# Patient Record
Sex: FEMALE | Race: WHITE | Employment: OTHER | URBAN - NONMETROPOLITAN AREA
[De-identification: names, ages, dates, MRNs, and addresses within clinical notes are randomized per-mention and may not be internally consistent; named-entity substitution may affect disease eponyms.]

---

## 2017-01-30 ENCOUNTER — HOSPITAL ENCOUNTER (OUTPATIENT)
Dept: GENERAL RADIOLOGY | Age: 77
Discharge: HOME | End: 2017-01-30
Payer: MEDICARE

## 2017-01-30 DIAGNOSIS — M25.561 PAIN IN BOTH KNEES, UNSPECIFIED CHRONICITY: ICD-10-CM

## 2017-01-30 DIAGNOSIS — M25.562 PAIN IN BOTH KNEES, UNSPECIFIED CHRONICITY: ICD-10-CM

## 2017-01-30 PROCEDURE — 73565 X-RAY EXAM OF KNEES: CPT

## 2017-01-30 PROCEDURE — 73562 X-RAY EXAM OF KNEE 3: CPT

## 2021-03-10 ENCOUNTER — IMMUNIZATION (OUTPATIENT)
Age: 81
End: 2021-03-10
Payer: MEDICARE

## 2021-03-10 PROCEDURE — 0001A COVID-19, PFIZER VACCINE 30MCG/0.3ML DOSE: CPT | Performed by: FAMILY MEDICINE

## 2021-03-10 PROCEDURE — 91300 COVID-19, PFIZER VACCINE 30MCG/0.3ML DOSE: CPT | Performed by: FAMILY MEDICINE

## 2021-03-18 LAB
ALBUMIN SERPL-MCNC: 3.9 G/DL (ref 3.5–5.2)
ALP BLD-CCNC: 96 U/L (ref 35–104)
ALT SERPL-CCNC: 13 U/L (ref 5–33)
ANION GAP SERPL CALCULATED.3IONS-SCNC: 8 MMOL/L (ref 7–19)
AST SERPL-CCNC: 22 U/L (ref 5–32)
BILIRUB SERPL-MCNC: 0.6 MG/DL (ref 0.2–1.2)
BUN BLDV-MCNC: 10 MG/DL (ref 8–23)
CALCIUM SERPL-MCNC: 9.2 MG/DL (ref 8.8–10.2)
CHLORIDE BLD-SCNC: 95 MMOL/L (ref 98–111)
CHOLESTEROL, TOTAL: 180 MG/DL (ref 160–199)
CO2: 32 MMOL/L (ref 22–29)
CREAT SERPL-MCNC: 0.6 MG/DL (ref 0.5–0.9)
GFR AFRICAN AMERICAN: >59
GFR NON-AFRICAN AMERICAN: >60
GLUCOSE BLD-MCNC: 103 MG/DL (ref 74–109)
HBA1C MFR BLD: 6 % (ref 4–6)
HDLC SERPL-MCNC: 61 MG/DL (ref 65–121)
LDL CHOLESTEROL CALCULATED: 87 MG/DL
POTASSIUM SERPL-SCNC: 5.1 MMOL/L (ref 3.5–5)
SODIUM BLD-SCNC: 135 MMOL/L (ref 136–145)
TOTAL PROTEIN: 6.9 G/DL (ref 6.6–8.7)
TRIGL SERPL-MCNC: 158 MG/DL (ref 0–149)

## 2021-03-31 ENCOUNTER — IMMUNIZATION (OUTPATIENT)
Age: 81
End: 2021-03-31
Payer: MEDICARE

## 2021-03-31 PROCEDURE — 0002A COVID-19, PFIZER VACCINE 30MCG/0.3ML DOSE: CPT | Performed by: FAMILY MEDICINE

## 2021-03-31 PROCEDURE — 91300 COVID-19, PFIZER VACCINE 30MCG/0.3ML DOSE: CPT | Performed by: FAMILY MEDICINE

## 2021-06-24 LAB
ALBUMIN SERPL-MCNC: 3.7 G/DL (ref 3.5–5.2)
ALP BLD-CCNC: 89 U/L (ref 35–104)
ALT SERPL-CCNC: 9 U/L (ref 5–33)
ANION GAP SERPL CALCULATED.3IONS-SCNC: 10 MMOL/L (ref 7–19)
AST SERPL-CCNC: 16 U/L (ref 5–32)
BASOPHILS ABSOLUTE: 0 K/UL (ref 0–0.2)
BASOPHILS RELATIVE PERCENT: 0.5 % (ref 0–1)
BILIRUB SERPL-MCNC: 0.5 MG/DL (ref 0.2–1.2)
BUN BLDV-MCNC: 10 MG/DL (ref 8–23)
CALCIUM SERPL-MCNC: 8.7 MG/DL (ref 8.8–10.2)
CHLORIDE BLD-SCNC: 97 MMOL/L (ref 98–111)
CHOLESTEROL, TOTAL: 172 MG/DL (ref 160–199)
CO2: 30 MMOL/L (ref 22–29)
CREAT SERPL-MCNC: 0.6 MG/DL (ref 0.5–0.9)
EOSINOPHILS ABSOLUTE: 0.1 K/UL (ref 0–0.6)
EOSINOPHILS RELATIVE PERCENT: 1.6 % (ref 0–5)
GFR AFRICAN AMERICAN: >59
GFR NON-AFRICAN AMERICAN: >60
GLUCOSE BLD-MCNC: 106 MG/DL (ref 74–109)
HBA1C MFR BLD: 5.8 % (ref 4–6)
HCT VFR BLD CALC: 40.3 % (ref 37–47)
HDLC SERPL-MCNC: 60 MG/DL (ref 65–121)
HEMOGLOBIN: 13.5 G/DL (ref 12–16)
IMMATURE GRANULOCYTES #: 0 K/UL
LDL CHOLESTEROL CALCULATED: 89 MG/DL
LYMPHOCYTES ABSOLUTE: 1.7 K/UL (ref 1.1–4.5)
LYMPHOCYTES RELATIVE PERCENT: 26.9 % (ref 20–40)
MCH RBC QN AUTO: 31.8 PG (ref 27–31)
MCHC RBC AUTO-ENTMCNC: 33.5 G/DL (ref 33–37)
MCV RBC AUTO: 94.8 FL (ref 81–99)
MONOCYTES ABSOLUTE: 0.5 K/UL (ref 0–0.9)
MONOCYTES RELATIVE PERCENT: 8.4 % (ref 0–10)
NEUTROPHILS ABSOLUTE: 3.9 K/UL (ref 1.5–7.5)
NEUTROPHILS RELATIVE PERCENT: 62.3 % (ref 50–65)
PDW BLD-RTO: 13.6 % (ref 11.5–14.5)
PLATELET # BLD: 182 K/UL (ref 130–400)
PMV BLD AUTO: 10.6 FL (ref 9.4–12.3)
POTASSIUM SERPL-SCNC: 4.6 MMOL/L (ref 3.5–5)
RBC # BLD: 4.25 M/UL (ref 4.2–5.4)
SODIUM BLD-SCNC: 137 MMOL/L (ref 136–145)
TOTAL PROTEIN: 6.5 G/DL (ref 6.6–8.7)
TRIGL SERPL-MCNC: 116 MG/DL (ref 0–149)
WBC # BLD: 6.2 K/UL (ref 4.8–10.8)

## 2022-11-23 ENCOUNTER — APPOINTMENT (OUTPATIENT)
Dept: CT IMAGING | Facility: HOSPITAL | Age: 82
End: 2022-11-23

## 2022-11-23 ENCOUNTER — APPOINTMENT (OUTPATIENT)
Dept: GENERAL RADIOLOGY | Facility: HOSPITAL | Age: 82
End: 2022-11-23

## 2022-11-23 ENCOUNTER — HOSPITAL ENCOUNTER (EMERGENCY)
Facility: HOSPITAL | Age: 82
Discharge: HOME | End: 2022-11-23
Attending: EMERGENCY MEDICINE | Admitting: EMERGENCY MEDICINE

## 2022-11-23 VITALS
RESPIRATION RATE: 16 BRPM | BODY MASS INDEX: 34.93 KG/M2 | TEMPERATURE: 97.9 F | HEART RATE: 65 BPM | HEIGHT: 61 IN | DIASTOLIC BLOOD PRESSURE: 60 MMHG | SYSTOLIC BLOOD PRESSURE: 106 MMHG | WEIGHT: 185 LBS | OXYGEN SATURATION: 99 %

## 2022-11-23 DIAGNOSIS — S90.31XA CONTUSION OF RIGHT FOOT, INITIAL ENCOUNTER: ICD-10-CM

## 2022-11-23 DIAGNOSIS — S60.032A CONTUSION OF LEFT MIDDLE FINGER WITHOUT DAMAGE TO NAIL, INITIAL ENCOUNTER: ICD-10-CM

## 2022-11-23 DIAGNOSIS — S20.211A CONTUSION OF RIGHT CHEST WALL, INITIAL ENCOUNTER: ICD-10-CM
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
## 2022-11-23 PROCEDURE — 73140 X-RAY EXAM OF FINGER(S): CPT

## 2022-11-23 PROCEDURE — 73630 X-RAY EXAM OF FOOT: CPT

## 2022-11-23 PROCEDURE — 99282 EMERGENCY DEPT VISIT SF MDM: CPT

## 2022-11-23 PROCEDURE — 71250 CT THORAX DX C-: CPT

## 2022-11-23 RX ORDER — MONTELUKAST SODIUM 10 MG/1
TABLET ORAL
COMMUNITY
Start: 2022-09-06

## 2022-11-23 RX ORDER — METOPROLOL TARTRATE 50 MG/1
TABLET, FILM COATED ORAL
COMMUNITY
Start: 2022-09-06

## 2022-11-23 RX ORDER — FLUOXETINE HYDROCHLORIDE 20 MG/1
CAPSULE ORAL
COMMUNITY
Start: 2022-09-06

## 2022-11-23 RX ORDER — OMEPRAZOLE 20 MG/1
CAPSULE, DELAYED RELEASE ORAL
COMMUNITY
Start: 2022-09-06

## 2022-11-23 NOTE — ED PROVIDER NOTES
Subjective   History of Present Illness  Patient says she fell last night when she tripped and fell forward.  She complains of pain mostly in her right posterior ribs and upper flank area.  She says that he still is not too painful but whenever she moves it hurts a great deal.  She also has some swelling and tenderness in her right foot head and her left middle finger.  She denies any head trauma or loss of consciousness or neck pain    History provided by:  Patient   used: No    Fall  Mechanism of injury: fall    Injury location:  Torso, foot and finger  Finger injury location:  L long finger  Torso injury location:  R chest  Foot injury location:  R foot  Incident location:  Home  Time since incident:  16 hours  Arrived directly from scene: no    Fall:     Fall occurred:  Tripped    Impact surface:  Hard floor    Point of impact:  Unable to specify    Entrapped after fall: no    Protective equipment: none    Suspicion of alcohol use: no    Suspicion of drug use: no    Tetanus status:  Unknown  Prior to arrival data:     Bystander interventions:  None    Patient ambulatory at scene: no      Blood loss:  None    Responsiveness at scene:  Alert    Orientation at scene:  Person, place, situation and time    Loss of consciousness: no      Amnesic to event: no      Airway interventions:  None    Breathing interventions:  None    IV access status:  None    IO access:  None    Fluids administered:  None    Cardiac interventions:  None    Medications administered:  None    Immobilization:  None    Airway condition since incident:  Stable    Breathing condition since incident:  Stable    Circulation condition since incident:  Stable    Mental status condition since incident:  Stable    Disability condition since incident:  Stable  Associated symptoms: back pain    Associated symptoms: no abdominal pain, no blindness, no chest pain, no difficulty breathing, no headaches, no hearing loss, no loss of  consciousness, no nausea, no neck pain, no seizures and no vomiting    Risk factors: no AICD, no asthma, no CABG, no CHF, no COPD, no hemophilia, no kidney disease, no past MI and not pregnant        Review of Systems   Constitutional: Negative.    HENT: Negative.  Negative for hearing loss.    Eyes: Negative for blindness.   Respiratory: Negative.    Cardiovascular: Negative.  Negative for chest pain.   Gastrointestinal: Negative.  Negative for abdominal pain, nausea and vomiting.   Genitourinary: Negative.    Musculoskeletal: Positive for back pain. Negative for neck pain.   Skin: Negative.    Neurological: Negative.  Negative for seizures, loss of consciousness and headaches.   Psychiatric/Behavioral: Negative.    All other systems reviewed and are negative.      Past Medical History:   Diagnosis Date   • Diabetes mellitus (HCC)    • GERD (gastroesophageal reflux disease)    • Hypertension        Allergies   Allergen Reactions   • Sulfa Antibiotics Rash       Past Surgical History:   Procedure Laterality Date   • CHOLECYSTECTOMY     • HEMORRHOIDECTOMY     • CORTEZ'S NEUROMA EXCISION Left        Family History   Problem Relation Age of Onset   • Heart disease Mother    • Diabetes Mother    • Hypertension Mother    • COPD Father    • Heart disease Father    • Hypertension Father        Social History     Socioeconomic History   • Marital status:    Tobacco Use   • Smoking status: Former     Types: Cigarettes   • Smokeless tobacco: Never   • Tobacco comments:     Social smoker when young    Vaping Use   • Vaping Use: Never used   Substance and Sexual Activity   • Alcohol use: Never   • Drug use: Never   • Sexual activity: Defer       Prior to Admission medications    Not on File       Medications - No data to display    Vitals:    11/23/22 1535   BP: 106/60   Pulse: 65   Resp: 16   Temp: 97.9 °F (36.6 °C)   SpO2: 99%         Objective   Physical Exam  Vitals and nursing note reviewed.   Constitutional:        Appearance: Normal appearance.   HENT:      Head: Normocephalic and atraumatic.      Mouth/Throat:      Mouth: Mucous membranes are moist.      Pharynx: Oropharynx is clear.   Eyes:      Extraocular Movements: Extraocular movements intact.      Pupils: Pupils are equal, round, and reactive to light.   Cardiovascular:      Rate and Rhythm: Normal rate and regular rhythm.   Pulmonary:      Effort: Pulmonary effort is normal.      Breath sounds: Normal breath sounds.      Comments: Patient is tender to palpation in the posterior lateral lower right ribs.  There is no crepitus or deformity.  Abdominal:      General: Bowel sounds are normal.      Palpations: Abdomen is soft.      Tenderness: There is no abdominal tenderness.   Musculoskeletal:         General: Normal range of motion.      Cervical back: Normal range of motion and neck supple.   Skin:     General: Skin is warm and dry.   Neurological:      General: No focal deficit present.      Mental Status: She is alert and oriented to person, place, and time.   Psychiatric:         Mood and Affect: Mood normal.         Behavior: Behavior normal.         Procedures         Lab Results (last 24 hours)     ** No results found for the last 24 hours. **          CT Chest Without Contrast Diagnostic   Final Result   1. No evidence of acute post traumatic injury to the thorax..   2. Spondylosis throughout the thoracic spine with scoliosis convex to   the right.   3. Linear atelectasis or scarring within both lungs. No evidence of   pulmonary contusion or pneumothorax. No consolidative pneumonia or   effusion.   4. Atheromatous calcification and ectasia of the thoracic aorta and   great vessels.   5. Large hiatal hernia.           This report was finalized on 11/23/2022 15:14 by Dr. Harvey Carney MD.      XR Foot 3+ View Right   Final Result   No fracture, dislocation or acute osseous findings. Advanced   arthritic changes at multiple joints as detailed above. Mild  dorsal soft   tissue swelling over the distal foot.   This report was finalized on 11/23/2022 14:49 by Dr. Hernando Mcintosh MD.      XR Finger 2+ View Left   Final Result   No fracture, dislocation or acute osseous findings. Advanced   osteoarthritis at several joints as detailed above. Diffuse osteopenia.   This report was finalized on 11/23/2022 14:45 by Dr. Hernando Mcintosh MD.          ED Course  ED Course as of 11/23/22 1615   Wed Nov 23, 2022   1614 I told the patient her x-rays were okay but she would be bruised and sore but nothing broken.  She is discharged in stable condition. [TR]      ED Course User Index  [TR] Timo Reese Jr., MD          MDM  Number of Diagnoses or Management Options  Contusion of left middle finger without damage to nail, initial encounter: new and requires workup  Contusion of right chest wall, initial encounter: new and requires workup  Contusion of right foot, initial encounter: new and requires workup  Fall, initial encounter: new and requires workup     Amount and/or Complexity of Data Reviewed  Tests in the radiology section of CPT®: ordered and reviewed    Risk of Complications, Morbidity, and/or Mortality  Presenting problems: moderate  Diagnostic procedures: moderate  Management options: moderate    Patient Progress  Patient progress: stable      Final diagnoses:   Fall, initial encounter   Contusion of right chest wall, initial encounter   Contusion of right foot, initial encounter   Contusion of left middle finger without damage to nail, initial encounter          Timo Reese Jr., MD  11/23/22 1615

## 2023-03-22 ENCOUNTER — TRANSCRIBE ORDERS (OUTPATIENT)
Dept: ADMINISTRATIVE | Facility: HOSPITAL | Age: 83
End: 2023-03-22
Payer: COMMERCIAL

## 2023-03-22 DIAGNOSIS — R10.9 ACUTE ABDOMINAL PAIN: Primary | ICD-10-CM

## 2023-04-03 ENCOUNTER — HOSPITAL ENCOUNTER (OUTPATIENT)
Dept: CT IMAGING | Facility: HOSPITAL | Age: 83
Discharge: HOME | End: 2023-04-03
Admitting: FAMILY MEDICINE
Payer: MEDICARE

## 2023-04-03 DIAGNOSIS — R10.9 ACUTE ABDOMINAL PAIN: ICD-10-CM

## 2023-04-03 LAB — CREAT BLDA-MCNC: 0.9 MG/DL (ref 0.6–1.3)

## 2023-04-03 PROCEDURE — 82565 ASSAY OF CREATININE: CPT

## 2023-04-03 PROCEDURE — 25510000001 IOPAMIDOL 61 % SOLUTION: Performed by: FAMILY MEDICINE

## 2023-04-03 PROCEDURE — 74177 CT ABD & PELVIS W/CONTRAST: CPT

## 2023-04-03 RX ADMIN — IOPAMIDOL 100 ML: 612 INJECTION, SOLUTION INTRAVENOUS at 10:09

## 2023-04-18 ENCOUNTER — HOSPITAL ENCOUNTER (EMERGENCY)
Facility: HOSPITAL | Age: 83
Discharge: HOME | End: 2023-04-19
Admitting: STUDENT IN AN ORGANIZED HEALTH CARE EDUCATION/TRAINING PROGRAM
Payer: MEDICARE

## 2023-04-18 ENCOUNTER — APPOINTMENT (OUTPATIENT)
Dept: GENERAL RADIOLOGY | Facility: HOSPITAL | Age: 83
End: 2023-04-18
Payer: MEDICARE

## 2023-04-18 DIAGNOSIS — S22.31XA CLOSED FRACTURE OF ONE RIB OF RIGHT SIDE, INITIAL ENCOUNTER: Primary | ICD-10-CM

## 2023-04-18 PROCEDURE — 99282 EMERGENCY DEPT VISIT SF MDM: CPT

## 2023-04-18 PROCEDURE — 71101 X-RAY EXAM UNILAT RIBS/CHEST: CPT

## 2023-04-18 RX ORDER — OMEPRAZOLE 20 MG/1
20 CAPSULE, DELAYED RELEASE ORAL DAILY
Qty: 14 CAPSULE | Refills: 0 | Status: SHIPPED | OUTPATIENT
Start: 2023-04-18 | End: 2023-05-02

## 2023-04-18 RX ORDER — IBUPROFEN 600 MG/1
600 TABLET ORAL EVERY 6 HOURS PRN
Qty: 40 TABLET | Refills: 0 | Status: SHIPPED | OUTPATIENT
Start: 2023-04-18 | End: 2023-04-28

## 2023-04-18 RX ORDER — ACETAMINOPHEN 325 MG/1
650 TABLET ORAL EVERY 6 HOURS PRN
Qty: 80 TABLET | Refills: 0 | Status: SHIPPED | OUTPATIENT
Start: 2023-04-18 | End: 2023-04-28

## 2023-04-18 RX ORDER — OXYCODONE HYDROCHLORIDE 5 MG/1
2.5 TABLET ORAL EVERY 6 HOURS PRN
Qty: 10 TABLET | Refills: 0 | Status: SHIPPED | OUTPATIENT
Start: 2023-04-18 | End: 2023-04-23

## 2023-04-19 VITALS
HEART RATE: 73 BPM | HEIGHT: 61 IN | RESPIRATION RATE: 16 BRPM | BODY MASS INDEX: 33.61 KG/M2 | OXYGEN SATURATION: 96 % | DIASTOLIC BLOOD PRESSURE: 87 MMHG | TEMPERATURE: 97.3 F | SYSTOLIC BLOOD PRESSURE: 127 MMHG | WEIGHT: 178 LBS

## 2023-04-19 NOTE — DISCHARGE INSTRUCTIONS
Your X-ray reveals that your 8th rib on your right side is broken. I am sending you home with an incentive spirometer to help you take deep breaths as well as pain medication and lidocaine patches. Please follow up with your PCP in the coming days and return to the ED with any new, worsening, or persistent symptoms.

## 2023-04-19 NOTE — ED PROVIDER NOTES
Subjective   History of Present Illness  Patient is an 82-year-old female who presents to the ED today complaining of right anterior rib pain since Saturday, she reports she was leaning over her 's wheelchair ramp when she made contact with the ramp and felt a pop to the area.  She reports since then she has had pinpoint tenderness to the area that is worse on palpation as well as worse with deep breathing. States she really noticed the pain tonight when she went to position herself to lie down for bed. She denies any chest pain or shortness of breath, skin is intact and there is no bruising noted to the area.  PMH is significant for diabetes, GERD, and hypertension.  Differential diagnoses: Rib fracture, rib contusion, muscle strain, and other.    History provided by:  Patient   used: No        Review of Systems   Constitutional: Negative.    HENT: Negative.    Eyes: Negative.    Respiratory: Negative for cough, shortness of breath, wheezing and stridor.    Cardiovascular: Negative for chest pain, palpitations and leg swelling.        Right anterior rib pain   Gastrointestinal: Negative for abdominal pain, diarrhea, nausea and vomiting.   Genitourinary: Negative.    Musculoskeletal: Negative.    Skin: Negative for color change and wound.   Neurological: Negative.    Psychiatric/Behavioral: Negative.        Past Medical History:   Diagnosis Date   • Diabetes mellitus    • GERD (gastroesophageal reflux disease)    • Hypertension        Allergies   Allergen Reactions   • Sulfa Antibiotics Rash       Past Surgical History:   Procedure Laterality Date   • CHOLECYSTECTOMY     • HEMORRHOIDECTOMY     • CORTEZ'S NEUROMA EXCISION Left        Family History   Problem Relation Age of Onset   • Heart disease Mother    • Diabetes Mother    • Hypertension Mother    • COPD Father    • Heart disease Father    • Hypertension Father        Social History     Socioeconomic History   • Marital status:     Tobacco Use   • Smoking status: Former     Types: Cigarettes   • Smokeless tobacco: Never   • Tobacco comments:     Social smoker when young    Vaping Use   • Vaping Use: Never used   Substance and Sexual Activity   • Alcohol use: Never   • Drug use: Never   • Sexual activity: Defer           Objective   Physical Exam  Vitals and nursing note reviewed.   Constitutional:       General: She is not in acute distress.     Appearance: Normal appearance. She is not ill-appearing, toxic-appearing or diaphoretic.   HENT:      Head: Normocephalic and atraumatic.      Right Ear: External ear normal.      Left Ear: External ear normal.      Nose: Nose normal.   Eyes:      Extraocular Movements: Extraocular movements intact.      Conjunctiva/sclera: Conjunctivae normal.      Pupils: Pupils are equal, round, and reactive to light.   Cardiovascular:      Rate and Rhythm: Normal rate and regular rhythm.      Pulses: Normal pulses.      Heart sounds: Normal heart sounds.   Pulmonary:      Effort: Pulmonary effort is normal. No respiratory distress.      Breath sounds: Normal breath sounds. No stridor. No wheezing, rhonchi or rales.      Comments: Lung sounds clear bilaterally  Chest:      Chest wall: Tenderness present. No deformity or swelling.       Musculoskeletal:      Cervical back: Normal range of motion.   Skin:     General: Skin is warm and dry.      Capillary Refill: Capillary refill takes less than 2 seconds.   Neurological:      Mental Status: She is alert and oriented to person, place, and time. Mental status is at baseline.      GCS: GCS eye subscore is 4. GCS verbal subscore is 5. GCS motor subscore is 6.   Psychiatric:         Mood and Affect: Mood normal.         Behavior: Behavior normal.         Thought Content: Thought content normal.         Judgment: Judgment normal.       XR Ribs Right With PA Chest   Final Result       1. Mildly displaced fracture of the right anterior eighth rib. No   pneumothorax. Lungs  are well expanded.   2. Hiatal hernia.   This report was finalized on 04/18/2023 21:49 by Dr Donaldo Caldwell, .          Procedures           ED Course  ED Course as of 04/19/23 1038   Tue Apr 18, 2023 2206 RT to perform incentive spirometry at bedside along with teaching. [HE]   2259 Per nursing staff the patient's incentive spirometry reading was 1,000. [HE]      ED Course User Index  [HE] Cheyenne Lira, APRN                                           Medical Decision Making  Patient is an 82-year-old female who presents to the ED today complaining of right anterior rib pain since Saturday, she reports she was leaning over her 's wheelchair ramp when she made contact with the ramp and felt a pop to the area.  She reports since then she has had pinpoint tenderness to the area that is worse on palpation as well as worse with deep breathing. States she really noticed the pain tonight when she went to position herself to lie down for bed. She denies any chest pain or shortness of breath, skin is intact and there is no bruising noted to the area.  PMH is significant for diabetes, GERD, and hypertension.  Differential diagnoses: Rib fracture, rib contusion, muscle strain, and other.    XR reveals mildly displaced fracture of the right anterior eighth rib. No pneumothorax. Lungs are well expanded. Hiatal hernia.  Results discussed at bedside. Pt case discussed with Dr. Rocha.  RT has presented to bedside to perform incentive spirometry; result is 1,000. Pt will be sent home with the device, RT has provided educations on use.   Pt will be discharged home with pain medication and instructions for close f/u with PCP, return precautions given. VS reassuring, pt remains in no distress, no requiring O2, no pneumothorax. Pt agreeable and appreciative, discharged home in stable condition.     Closed fracture of one rib of right side, initial encounter: acute illness or injury  Amount and/or Complexity of Data  Reviewed  Radiology: ordered.          Final diagnoses:   Closed fracture of one rib of right side, initial encounter       ED Disposition  ED Disposition     ED Disposition   Discharge    Condition   Stable    Comment   --             Jermaine Wolff MD  [address] [facility] [id number] [phone]               Medication List      New Prescriptions    acetaminophen 325 MG tablet  Commonly known as: Tylenol  Take 2 tablets by mouth Every 6 (Six) Hours As Needed for Mild Pain or Moderate Pain for up to 10 days.     ibuprofen 600 MG tablet  Commonly known as: ADVIL,MOTRIN  Take 1 tablet by mouth Every 6 (Six) Hours As Needed for Moderate Pain or Mild Pain for up to 10 days.     oxyCODONE 5 MG immediate release tablet  Commonly known as: Roxicodone  Take 0.5 tablets by mouth Every 6 (Six) Hours As Needed for Severe Pain for up to 5 days.        Changed    omeprazole 20 MG capsule  Commonly known as: priLOSEC  Take 1 capsule by mouth Daily for 14 days.  What changed:   · how much to take  · how to take this  · when to take this           Where to Get Your Medications      These medications were sent to [facility] [address] [place] [address] - [address] [facility] [facility] - [phone] [facility] [phone] FX  [address] [facility] [address]    Phone: [phone]   · acetaminophen 325 MG tablet  · ibuprofen 600 MG tablet  · omeprazole 20 MG capsule  · oxyCODONE 5 MG immediate release tablet          Cheyenne Lira, APRN  04/19/23 1038

## 2023-09-01 ENCOUNTER — HOSPITAL ENCOUNTER (OUTPATIENT)
Dept: GENERAL RADIOLOGY | Facility: HOSPITAL | Age: 83
Discharge: HOME | End: 2023-09-01
Payer: COMMERCIAL

## 2023-09-01 PROCEDURE — 72100 X-RAY EXAM L-S SPINE 2/3 VWS: CPT

## 2023-09-01 PROCEDURE — 72072 X-RAY EXAM THORAC SPINE 3VWS: CPT

## 2024-10-19 ENCOUNTER — HOSPITAL ENCOUNTER (EMERGENCY)
Facility: HOSPITAL | Age: 84
Discharge: HOME | End: 2024-10-19
Attending: EMERGENCY MEDICINE
Payer: MEDICARE

## 2024-10-19 ENCOUNTER — APPOINTMENT (OUTPATIENT)
Dept: CT IMAGING | Facility: HOSPITAL | Age: 84
End: 2024-10-19
Payer: MEDICARE

## 2024-10-19 VITALS
RESPIRATION RATE: 20 BRPM | WEIGHT: 174 LBS | OXYGEN SATURATION: 99 % | TEMPERATURE: 97.5 F | HEIGHT: 61 IN | SYSTOLIC BLOOD PRESSURE: 150 MMHG | BODY MASS INDEX: 32.85 KG/M2 | HEART RATE: 62 BPM | DIASTOLIC BLOOD PRESSURE: 70 MMHG
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
## 2024-10-19 DIAGNOSIS — S01.01XA LACERATION OF SCALP, INITIAL ENCOUNTER: ICD-10-CM

## 2024-10-19 PROCEDURE — 90715 TDAP VACCINE 7 YRS/> IM: CPT | Performed by: EMERGENCY MEDICINE

## 2024-10-19 PROCEDURE — 25010000002 LIDOCAINE-EPINEPHRINE 2 %-1:100000 SOLUTION: Performed by: EMERGENCY MEDICINE

## 2024-10-19 PROCEDURE — 90472 IMMUNIZATION ADMIN EACH ADD: CPT | Performed by: EMERGENCY MEDICINE

## 2024-10-19 PROCEDURE — 99284 EMERGENCY DEPT VISIT MOD MDM: CPT

## 2024-10-19 PROCEDURE — 90471 IMMUNIZATION ADMIN: CPT | Performed by: EMERGENCY MEDICINE

## 2024-10-19 PROCEDURE — 70450 CT HEAD/BRAIN W/O DYE: CPT

## 2024-10-19 PROCEDURE — 25010000002 TETANUS-DIPHTH-ACELL PERTUSSIS 5-2.5-18.5 LF-MCG/0.5 SUSPENSION PREFILLED SYRINGE: Performed by: EMERGENCY MEDICINE

## 2024-10-19 RX ORDER — LIDOCAINE 40 MG/G
1 CREAM TOPICAL AS NEEDED
Status: DISCONTINUED | OUTPATIENT
Start: 2024-10-19 | End: 2024-10-19 | Stop reason: HOSPADM

## 2024-10-19 RX ORDER — LIDOCAINE HYDROCHLORIDE AND EPINEPHRINE BITARTRATE 20; .01 MG/ML; MG/ML
10 INJECTION, SOLUTION SUBCUTANEOUS ONCE
Status: DISCONTINUED | OUTPATIENT
Start: 2024-10-19 | End: 2024-10-19 | Stop reason: HOSPADM

## 2024-10-19 RX ADMIN — TETANUS TOXOID, REDUCED DIPHTHERIA TOXOID AND ACELLULAR PERTUSSIS VACCINE, ADSORBED 0.5 ML: 5; 2.5; 8; 8; 2.5 SUSPENSION INTRAMUSCULAR at 18:39

## 2024-10-19 NOTE — ED PROVIDER NOTES
Subjective   History of Present Illness    This is a turnover case from Dr. Farrar.  Patient remains neurologically intact and after completing a CT scan of her head without contrast.  Her headache has subsided.    Review of Systems    Past Medical History:   Diagnosis Date    Diabetes mellitus     GERD (gastroesophageal reflux disease)     Hypertension        Allergies   Allergen Reactions    Sulfa Antibiotics Rash       Past Surgical History:   Procedure Laterality Date    CHOLECYSTECTOMY      HEMORRHOIDECTOMY      CORTEZ'S NEUROMA EXCISION Left        Family History   Problem Relation Age of Onset    Heart disease Mother     Diabetes Mother     Hypertension Mother     COPD Father     Heart disease Father     Hypertension Father        Social History     Socioeconomic History    Marital status:    Tobacco Use    Smoking status: Former     Types: Cigarettes    Smokeless tobacco: Never    Tobacco comments:     Social smoker when young    Vaping Use    Vaping status: Never Used   Substance and Sexual Activity    Alcohol use: Never    Drug use: Never    Sexual activity: Defer       Prior to Admission medications    Medication Sig Start Date End Date Taking? Authorizing Provider   famotidine (PEPCID) 20 MG tablet Take 1 tablet by mouth At Night As Needed for Heartburn or Indigestion. 9/1/23   Isiah Arredondo APRN   FLUoxetine (PROzac) 20 MG capsule  9/6/22   ProviderNolberto MD   metFORMIN (GLUCOPHAGE) 1000 MG tablet  9/6/22   ProviderNolberto MD   metoprolol tartrate (LOPRESSOR) 50 MG tablet  9/6/22   ProviderNolberto MD   montelukast (SINGULAIR) 10 MG tablet  9/6/22   ProviderNolberto MD       Medications   lidocaine (LMX) 4 % cream 1 Application (has no administration in time range)   Tetanus-Diphth-Acell Pertussis (BOOSTRIX) injection 0.5 mL (0.5 mL Intramuscular Given 10/19/24 9269)   lidocaine-EPINEPHrine (XYLOCAINE W/EPI) 2 %-1:210082 injection 10 mL (10 mL Injection Given 10/19/24  "1839)       /79   Pulse 70   Temp 97.9 °F (36.6 °C)   Resp 16   Ht 154.9 cm (61\")   Wt 78.9 kg (174 lb)   SpO2 98%   BMI 32.88 kg/m²       Objective   Physical Exam    Laceration Repair    Date/Time: 10/19/2024 7:15 PM    Performed by: Vineet Castillo PA  Authorized by: Chadwick Farrar MD    Consent:     Consent obtained:  Verbal    Consent given by:  Patient    Risks discussed:  Infection  Laceration details:     Location:  Scalp    Scalp location:  L parietal    Length (cm):  1    Depth (mm):  2  Pre-procedure details:     Preparation:  Patient was prepped and draped in usual sterile fashion and imaging obtained to evaluate for foreign bodies  Treatment:     Area cleansed with:  Soap and water    Amount of cleaning:  Standard    Debridement:  None  Skin repair:     Repair method:  Staples    Number of staples:  1  Approximation:     Approximation:  Close  Repair type:     Repair type:  Simple  Post-procedure details:     Dressing:  Antibiotic ointment    Procedure completion:  Tolerated well, no immediate complications           Lab Results (last 24 hours)       ** No results found for the last 24 hours. **            CT Head Without Contrast    Result Date: 10/19/2024  Narrative: CT HEAD WO CONTRAST- 10/19/2024 4:43 PM  HISTORY: Fall; W19.XXXA-Unspecified fall, initial encounter; S01.01XA-Laceration without foreign body of scalp, initial encounter   DOSE LENGTH PRODUCT: 708.02 mGy.cm. Automated exposure control was also utilized to decrease patient radiation dose.  TECHNIQUE: Axial CT of the brain without IV contrast. Sagittal and coronal reformations are also provided for review. Soft tissue and bone kernels are available for interpretation.  COMPARISON: None.  FINDINGS:  There is no evidence of acute large vascular territory infarct. Chronic small vessel ischemic changes. No intra-axial or extra-axial hemorrhage. No visualized mass lesion or mass effect. The ventricles, cortical sulci and " basal cisterns are symmetric and age appropriate. Posterior fossa structures are unremarkable. Parietal scalp contusion on the left. Calvarium is intact.      Impression: 1. No acute intracranial process. 2. Parietal scalp contusion. Calvarium is intact. 3. Chronic small vessel ischemic changes.  This report was signed and finalized on 10/19/2024 6:47 PM by Dr Donaldo Caldwell.       ED Course  ED Course as of 10/19/24 1916   Sat Oct 19, 2024   1722 Discussed this case with the patient  She did not lose consciousness she is agreeable to a CAT scan at this time.. [TS]   1723  Patient is 18 or older, presenting with minor blunt head trauma. Head CT (including cosigned orders) was ordered  by an emergency care clinician for trauma because patient is 65 or older.        [TS]   1914 Risks and benefits of laceration repair by jerrica Mike has been discussed with the patient.  She voiced understanding does not want to proceed with lidocaine injection since only 1 stable we placed.  She is agreeable to have a stapling of the laceration.      She tolerated staple repair without any complications.  Head injury precautions given.  Strict return precaution advised.  Tdap has been updated.  Patient will be discharged in stable condition. [TK]      ED Course User Index  [TK] Vineet Castillo PA  [TS] Chadwick Farrar MD          [facility]      Final diagnoses:   Fall, initial encounter   Laceration of scalp, initial encounter     Disposition: Patient will be discharged in stable condition.       Vineet Castillo PA  10/19/24 1916

## 2024-10-29 ENCOUNTER — HOSPITAL ENCOUNTER (EMERGENCY)
Facility: HOSPITAL | Age: 84
Discharge: HOME | End: 2024-10-29
Admitting: EMERGENCY MEDICINE
Payer: MEDICARE

## 2024-10-29 VITALS
BODY MASS INDEX: 32.66 KG/M2 | DIASTOLIC BLOOD PRESSURE: 71 MMHG | HEART RATE: 68 BPM | SYSTOLIC BLOOD PRESSURE: 149 MMHG | TEMPERATURE: 97.7 F | RESPIRATION RATE: 18 BRPM | OXYGEN SATURATION: 100 % | WEIGHT: 173 LBS | HEIGHT: 61 IN

## 2024-10-29 DIAGNOSIS — Z48.02 ENCOUNTER FOR STAPLE REMOVAL: Primary | ICD-10-CM

## 2024-10-29 PROCEDURE — 99202 OFFICE O/P NEW SF 15 MIN: CPT

## 2024-10-29 RX ORDER — ENALAPRIL MALEATE 20 MG/1
20 TABLET ORAL DAILY
COMMUNITY

## 2024-10-29 NOTE — ED PROVIDER NOTES
Subjective   History of Present Illness    Patient is an 84-year-old female presenting to ED with need for staple removal.  PMH significant for daily 81 mg aspirin use, non-insulin-dependent diabetes, GERD, hypertension.  Patient states on 10/19/2024 she was seen in the ED after a mechanical fall for which she had 1 staple placed in her head.  Patient states that she has been doing well with no drainage, no swelling, no tenderness, no bleeding and no headaches.  Patient presents at this time to have the staple removed with no further complications.  Patient is otherwise been doing well recently with no further illnesses, injuries.    Records reviewed show patient was seen in the ED on 10/19/24 for fall, scalp laceration. At that time patient had 1 staple placed to the scalp.  Patient's tetanus status was updated at that time.    Patient with no outpatient visits since being seen at the PCP office on 7/2/2024.    Review of Systems   Constitutional: Negative.  Negative for fever.   HENT: Negative.  Negative for tinnitus.    Eyes: Negative.  Negative for photophobia and visual disturbance.   Respiratory: Negative.     Cardiovascular: Negative.    Gastrointestinal: Negative.  Negative for nausea and vomiting.   Genitourinary: Negative.    Musculoskeletal: Negative.  Negative for neck pain.   Skin:  Positive for wound (Healing laceration, scalp).   Neurological: Negative.  Negative for dizziness, light-headedness and headaches.   Psychiatric/Behavioral: Negative.     All other systems reviewed and are negative.      Past Medical History:   Diagnosis Date    Diabetes mellitus     GERD (gastroesophageal reflux disease)     Hypertension        Allergies   Allergen Reactions    Sulfa Antibiotics Rash       Past Surgical History:   Procedure Laterality Date    CHOLECYSTECTOMY      HEMORRHOIDECTOMY      CORTEZ'S NEUROMA EXCISION Left        Family History   Problem Relation Age of Onset    Heart disease Mother     Diabetes  Mother     Hypertension Mother     COPD Father     Heart disease Father     Hypertension Father        Social History     Socioeconomic History    Marital status:    Tobacco Use    Smoking status: Former     Types: Cigarettes    Smokeless tobacco: Never    Tobacco comments:     Social smoker when young    Vaping Use    Vaping status: Never Used   Substance and Sexual Activity    Alcohol use: Never    Drug use: Never    Sexual activity: Defer           Objective   Physical Exam  Vitals and nursing note reviewed.   Constitutional:       General: She is not in acute distress.     Appearance: Normal appearance. She is not ill-appearing, toxic-appearing or diaphoretic.   HENT:      Head: Normocephalic.      Comments: Very well-healed laceration with small less than 0.5 cm area of scabbing to the top of the scalp with 1 staple in place.  No surrounding or streaking erythema, no induration or fluctuance, no tenderness, no wound dehiscence.  Remainder of scalp is normal to examination.     Mouth/Throat:      Mouth: Mucous membranes are moist.      Pharynx: Oropharynx is clear.   Eyes:      Pupils: Pupils are equal, round, and reactive to light.   Pulmonary:      Effort: Pulmonary effort is normal.   Musculoskeletal:         General: Normal range of motion.      Cervical back: Normal range of motion and neck supple.   Skin:     General: Skin is warm and dry.      Findings: Wound (As described in HEENT section) present.   Neurological:      Mental Status: She is alert and oriented to person, place, and time.      Gait: Gait normal.   Psychiatric:         Mood and Affect: Mood normal.         Behavior: Behavior normal.         Suture Removal    Date/Time: 10/29/2024 1:10 PM    Performed by: Ayan Reyes PA-C  Authorized by: Ayan Reyes PA-C    Consent:     Consent obtained:  Verbal    Consent given by:  Patient    Risks, benefits, and alternatives were discussed: yes      Risks discussed:  Bleeding, pain and  wound separation    Alternatives discussed:  No treatment, delayed treatment, alternative treatment, observation and referral  Universal protocol:     Procedure explained and questions answered to patient or proxy's satisfaction: yes      Relevant documents present and verified: yes      Site/side marked: yes      Immediately prior to procedure, a time out was called: yes      Patient identity confirmed:  Verbally with patient and arm band  Location:     Location:  Head/neck    Head/neck location:  Scalp  Procedure details:     Wound appearance:  No signs of infection, good wound healing, clean, nonpurulent, nontender and pink    Number of staples removed:  1  Post-procedure details:     Post-removal:  No dressing applied    Procedure completion:  Tolerated well, no immediate complications             ED Course                                               Medical Decision Making  Problems Addressed:  Encounter for staple removal: acute illness or injury    Amount and/or Complexity of Data Reviewed  External Data Reviewed: labs, radiology and notes.      Patient is an 84-year-old female presenting to ED with need for staple removal.  PMH significant for daily 81 mg aspirin use, non-insulin-dependent diabetes, GERD, hypertension.  Upon initial evaluation patient resting very comfortably in the bed in no acute distress.  Nontoxic-appearing and non-ill-appearing, nondiaphoretic.  Patient with hypertensive systolic pressures in the 140s and otherwise unremarkable vital signs.  Examination finds very well-healed laceration with small less than 0.5 cm area of scabbing to the top of the scalp with 1 staple in place.  No surrounding or streaking erythema, no induration or fluctuance, no tenderness, no wound dehiscence.  Remainder of scalp is normal to examination.  No further examination, dermatological, or HEENT abnormalities.  Discussed with patient at this time ability to remove staple for which staple removal was  performed and patient tolerated well.  No bleeding, no complications.  Discussed continued signs of infection, wound care, follow with her primary care provider as needed.  Advise strict return precautions any for immediate return to ED should she develop any new or worsening symptoms.  Patient is very appreciative with no further questions, concerns, needs at this time and is stable for discharge.    Differential diagnosis: Scalp laceration, cellulitis, wound infection, wound dehiscence, retained staple, staple removal, other    Final diagnoses:   Encounter for staple removal       ED Disposition  ED Disposition       ED Disposition   Discharge    Condition   Stable    Comment   --               Jermaine Wolff MD  [address] [facility] [address]  [phone]    Schedule an appointment as soon as possible for a visit       [facility] EMERGENCY DEPARTMENT  [address] [place] [id number] [phone]    As needed         Medication List      No changes were made to your prescriptions during this visit.            Ayan Reyes PA-C  10/29/24 0514     right intercostal space …

## 2024-12-03 ENCOUNTER — HOSPITAL ENCOUNTER (INPATIENT)
Facility: HOSPITAL | Age: 84
LOS: 3 days | Discharge: HOME HEALTH | DRG: 027 | End: 2024-12-06
Attending: FAMILY MEDICINE | Admitting: NEUROLOGICAL SURGERY
Payer: MEDICARE

## 2024-12-03 ENCOUNTER — ANESTHESIA EVENT (OUTPATIENT)
Dept: PERIOP | Facility: HOSPITAL | Age: 84
End: 2024-12-03
Payer: MEDICARE

## 2024-12-03 ENCOUNTER — ANESTHESIA (OUTPATIENT)
Dept: PERIOP | Facility: HOSPITAL | Age: 84
End: 2024-12-03
Payer: MEDICARE

## 2024-12-03 ENCOUNTER — APPOINTMENT (OUTPATIENT)
Dept: CT IMAGING | Facility: HOSPITAL | Age: 84
DRG: 027 | End: 2024-12-03
Payer: MEDICARE

## 2024-12-03 DIAGNOSIS — Z74.09 IMPAIRED MOBILITY: ICD-10-CM

## 2024-12-03 DIAGNOSIS — I62.00 SUBDURAL HEMORRHAGE: Primary | ICD-10-CM

## 2024-12-03 PROBLEM — S06.5XAA SDH (SUBDURAL HEMATOMA): Status: ACTIVE | Noted: 2024-12-03

## 2024-12-03 LAB
ALBUMIN SERPL-MCNC: 3.7 G/DL (ref 3.5–5.2)
ALBUMIN/GLOB SERPL: 1.2 G/DL
ALP SERPL-CCNC: 106 U/L (ref 39–117)
ALT SERPL W P-5'-P-CCNC: 8 U/L (ref 1–33)
ANION GAP SERPL CALCULATED.3IONS-SCNC: 12 MMOL/L (ref 5–15)
APTT PPP: 31.8 SECONDS (ref 24.5–36)
AST SERPL-CCNC: 14 U/L (ref 1–32)
BASOPHILS # BLD AUTO: 0.04 10*3/MM3 (ref 0–0.2)
BASOPHILS NFR BLD AUTO: 0.5 % (ref 0–1.5)
BILIRUB SERPL-MCNC: 0.7 MG/DL (ref 0–1.2)
BUN SERPL-MCNC: 9 MG/DL (ref 8–23)
BUN/CREAT SERPL: 14.5 (ref 7–25)
CALCIUM SPEC-SCNC: 8.7 MG/DL (ref 8.6–10.5)
CHLORIDE SERPL-SCNC: 91 MMOL/L (ref 98–107)
CO2 SERPL-SCNC: 29 MMOL/L (ref 22–29)
CREAT SERPL-MCNC: 0.62 MG/DL (ref 0.57–1)
DEPRECATED RDW RBC AUTO: 47.6 FL (ref 37–54)
EGFRCR SERPLBLD CKD-EPI 2021: 87.9 ML/MIN/1.73
EOSINOPHIL # BLD AUTO: 0.14 10*3/MM3 (ref 0–0.4)
EOSINOPHIL NFR BLD AUTO: 1.8 % (ref 0.3–6.2)
ERYTHROCYTE [DISTWIDTH] IN BLOOD BY AUTOMATED COUNT: 13.4 % (ref 12.3–15.4)
GLOBULIN UR ELPH-MCNC: 3.1 GM/DL
GLUCOSE BLDC GLUCOMTR-MCNC: 110 MG/DL (ref 70–130)
GLUCOSE SERPL-MCNC: 136 MG/DL (ref 65–99)
HCT VFR BLD AUTO: 43.3 % (ref 34–46.6)
HGB BLD-MCNC: 13.8 G/DL (ref 12–15.9)
HOLD SPECIMEN: NORMAL
IMM GRANULOCYTES # BLD AUTO: 0.02 10*3/MM3 (ref 0–0.05)
IMM GRANULOCYTES NFR BLD AUTO: 0.3 % (ref 0–0.5)
INR PPP: 1 (ref 0.91–1.09)
LYMPHOCYTES # BLD AUTO: 1.99 10*3/MM3 (ref 0.7–3.1)
LYMPHOCYTES NFR BLD AUTO: 25.7 % (ref 19.6–45.3)
MAGNESIUM SERPL-MCNC: 1.6 MG/DL (ref 1.6–2.4)
MCH RBC QN AUTO: 30.7 PG (ref 26.6–33)
MCHC RBC AUTO-ENTMCNC: 31.9 G/DL (ref 31.5–35.7)
MCV RBC AUTO: 96.2 FL (ref 79–97)
MONOCYTES # BLD AUTO: 0.65 10*3/MM3 (ref 0.1–0.9)
MONOCYTES NFR BLD AUTO: 8.4 % (ref 5–12)
NEUTROPHILS NFR BLD AUTO: 4.89 10*3/MM3 (ref 1.7–7)
NEUTROPHILS NFR BLD AUTO: 63.3 % (ref 42.7–76)
NRBC BLD AUTO-RTO: 0 /100 WBC (ref 0–0.2)
PLATELET # BLD AUTO: 219 10*3/MM3 (ref 140–450)
PMV BLD AUTO: 9.6 FL (ref 6–12)
POTASSIUM SERPL-SCNC: 4.2 MMOL/L (ref 3.5–5.2)
PROT SERPL-MCNC: 6.8 G/DL (ref 6–8.5)
PROTHROMBIN TIME: 13.6 SECONDS (ref 11.8–14.8)
RBC # BLD AUTO: 4.5 10*6/MM3 (ref 3.77–5.28)
SODIUM SERPL-SCNC: 132 MMOL/L (ref 136–145)
TROPONIN T SERPL HS-MCNC: <6 NG/L
WBC NRBC COR # BLD AUTO: 7.73 10*3/MM3 (ref 3.4–10.8)
WHOLE BLOOD HOLD COAG: NORMAL
WHOLE BLOOD HOLD SPECIMEN: NORMAL

## 2024-12-03 PROCEDURE — 93010 ELECTROCARDIOGRAM REPORT: CPT | Performed by: INTERNAL MEDICINE

## 2024-12-03 PROCEDURE — C1713 ANCHOR/SCREW BN/BN,TIS/BN: HCPCS | Performed by: NEUROLOGICAL SURGERY

## 2024-12-03 PROCEDURE — 25010000002 FENTANYL CITRATE (PF) 50 MCG/ML SOLUTION

## 2024-12-03 PROCEDURE — 85610 PROTHROMBIN TIME: CPT | Performed by: FAMILY MEDICINE

## 2024-12-03 PROCEDURE — 25010000002 LIDOCAINE PF 2% 2 % SOLUTION

## 2024-12-03 PROCEDURE — 25810000003 LACTATED RINGERS PER 1000 ML: Performed by: ANESTHESIOLOGY

## 2024-12-03 PROCEDURE — 61312 CRNEC/CRNOT STTL XDRL/SDRL: CPT | Performed by: NEUROLOGICAL SURGERY

## 2024-12-03 PROCEDURE — 00C40ZZ EXTIRPATION OF MATTER FROM INTRACRANIAL SUBDURAL SPACE, OPEN APPROACH: ICD-10-PCS | Performed by: NEUROLOGICAL SURGERY

## 2024-12-03 PROCEDURE — 25010000002 CEFAZOLIN PER 500 MG

## 2024-12-03 PROCEDURE — 93005 ELECTROCARDIOGRAM TRACING: CPT | Performed by: FAMILY MEDICINE

## 2024-12-03 PROCEDURE — 83735 ASSAY OF MAGNESIUM: CPT | Performed by: FAMILY MEDICINE

## 2024-12-03 PROCEDURE — 80053 COMPREHEN METABOLIC PANEL: CPT | Performed by: FAMILY MEDICINE

## 2024-12-03 PROCEDURE — 25010000002 ONDANSETRON PER 1 MG: Performed by: ANESTHESIOLOGY

## 2024-12-03 PROCEDURE — 25010000002 ESMOLOL 100 MG/10ML SOLUTION: Performed by: NURSE ANESTHETIST, CERTIFIED REGISTERED

## 2024-12-03 PROCEDURE — 99222 1ST HOSP IP/OBS MODERATE 55: CPT | Performed by: NURSE PRACTITIONER

## 2024-12-03 PROCEDURE — 85730 THROMBOPLASTIN TIME PARTIAL: CPT | Performed by: FAMILY MEDICINE

## 2024-12-03 PROCEDURE — 85025 COMPLETE CBC W/AUTO DIFF WBC: CPT

## 2024-12-03 PROCEDURE — 25010000002 ONDANSETRON PER 1 MG

## 2024-12-03 PROCEDURE — 82948 REAGENT STRIP/BLOOD GLUCOSE: CPT

## 2024-12-03 PROCEDURE — 99285 EMERGENCY DEPT VISIT HI MDM: CPT

## 2024-12-03 PROCEDURE — 25010000002 NICARDIPINE HCL IN NACL 20-0.9 MG/200ML-% SOLUTION

## 2024-12-03 PROCEDURE — 25010000002 SUGAMMADEX 200 MG/2ML SOLUTION

## 2024-12-03 PROCEDURE — 84484 ASSAY OF TROPONIN QUANT: CPT | Performed by: FAMILY MEDICINE

## 2024-12-03 PROCEDURE — 25010000002 PROPOFOL 10 MG/ML EMULSION

## 2024-12-03 PROCEDURE — 25810000003 SODIUM CHLORIDE 0.9 % SOLUTION: Performed by: NEUROLOGICAL SURGERY

## 2024-12-03 PROCEDURE — 25010000002 LEVETRIRACETAM PER 10 MG: Performed by: NURSE PRACTITIONER

## 2024-12-03 PROCEDURE — 25010000002 DEXAMETHASONE PER 1 MG

## 2024-12-03 PROCEDURE — 70450 CT HEAD/BRAIN W/O DYE: CPT

## 2024-12-03 PROCEDURE — 25010000002 BUPIVACAINE (PF) 0.25 % SOLUTION: Performed by: NEUROLOGICAL SURGERY

## 2024-12-03 DEVICE — HEMOST ABS SURGIFOAM SZ100 8X12 10MM: Type: IMPLANTABLE DEVICE | Site: CRANIAL | Status: FUNCTIONAL

## 2024-12-03 DEVICE — KT HEMOST ABS SURGIFOAM PORCN 1GRAM: Type: IMPLANTABLE DEVICE | Site: CRANIAL | Status: FUNCTIONAL

## 2024-12-03 DEVICE — ABSORBABLE HEMOSTAT (OXIDIZED REGENERATED CELLULOSE, U.S.P.)
Type: IMPLANTABLE DEVICE | Site: CRANIAL | Status: FUNCTIONAL
Brand: SURGICEL

## 2024-12-03 DEVICE — IMPLANTABLE DEVICE: Type: IMPLANTABLE DEVICE | Site: CRANIAL | Status: FUNCTIONAL

## 2024-12-03 DEVICE — SCRW MAXLOCK EXTRM NL 4X14MM: Type: IMPLANTABLE DEVICE | Site: CRANIAL | Status: FUNCTIONAL

## 2024-12-03 RX ORDER — BUPIVACAINE HYDROCHLORIDE 2.5 MG/ML
INJECTION, SOLUTION EPIDURAL; INFILTRATION; INTRACAUDAL AS NEEDED
Status: DISCONTINUED | OUTPATIENT
Start: 2024-12-03 | End: 2024-12-03 | Stop reason: HOSPADM

## 2024-12-03 RX ORDER — ONDANSETRON 2 MG/ML
INJECTION INTRAMUSCULAR; INTRAVENOUS AS NEEDED
Status: DISCONTINUED | OUTPATIENT
Start: 2024-12-03 | End: 2024-12-03 | Stop reason: SURG

## 2024-12-03 RX ORDER — SODIUM CHLORIDE 0.9 % (FLUSH) 0.9 %
3-10 SYRINGE (ML) INJECTION AS NEEDED
Status: DISCONTINUED | OUTPATIENT
Start: 2024-12-03 | End: 2024-12-03 | Stop reason: HOSPADM

## 2024-12-03 RX ORDER — FENTANYL CITRATE 50 UG/ML
50 INJECTION, SOLUTION INTRAMUSCULAR; INTRAVENOUS
Status: DISCONTINUED | OUTPATIENT
Start: 2024-12-03 | End: 2024-12-03 | Stop reason: HOSPADM

## 2024-12-03 RX ORDER — BISACODYL 5 MG/1
5 TABLET, DELAYED RELEASE ORAL DAILY PRN
Status: DISCONTINUED | OUTPATIENT
Start: 2024-12-03 | End: 2024-12-06 | Stop reason: HOSPADM

## 2024-12-03 RX ORDER — SODIUM CHLORIDE, SODIUM LACTATE, POTASSIUM CHLORIDE, CALCIUM CHLORIDE 600; 310; 30; 20 MG/100ML; MG/100ML; MG/100ML; MG/100ML
100 INJECTION, SOLUTION INTRAVENOUS CONTINUOUS
Status: DISPENSED | OUTPATIENT
Start: 2024-12-03 | End: 2024-12-04

## 2024-12-03 RX ORDER — ESMOLOL HYDROCHLORIDE 10 MG/ML
INJECTION INTRAVENOUS AS NEEDED
Status: DISCONTINUED | OUTPATIENT
Start: 2024-12-03 | End: 2024-12-03 | Stop reason: SURG

## 2024-12-03 RX ORDER — NALOXONE HCL 0.4 MG/ML
0.04 VIAL (ML) INJECTION AS NEEDED
Status: DISCONTINUED | OUTPATIENT
Start: 2024-12-03 | End: 2024-12-03 | Stop reason: HOSPADM

## 2024-12-03 RX ORDER — MAGNESIUM HYDROXIDE 1200 MG/15ML
LIQUID ORAL AS NEEDED
Status: DISCONTINUED | OUTPATIENT
Start: 2024-12-03 | End: 2024-12-03 | Stop reason: HOSPADM

## 2024-12-03 RX ORDER — SODIUM CHLORIDE 9 MG/ML
40 INJECTION, SOLUTION INTRAVENOUS AS NEEDED
Status: DISCONTINUED | OUTPATIENT
Start: 2024-12-03 | End: 2024-12-06 | Stop reason: HOSPADM

## 2024-12-03 RX ORDER — SODIUM CHLORIDE 9 MG/ML
40 INJECTION, SOLUTION INTRAVENOUS AS NEEDED
Status: DISCONTINUED | OUTPATIENT
Start: 2024-12-03 | End: 2024-12-03 | Stop reason: HOSPADM

## 2024-12-03 RX ORDER — LEVETIRACETAM 500 MG/5ML
500 INJECTION, SOLUTION, CONCENTRATE INTRAVENOUS EVERY 12 HOURS
Status: DISCONTINUED | OUTPATIENT
Start: 2024-12-03 | End: 2024-12-03 | Stop reason: SDUPTHER

## 2024-12-03 RX ORDER — DEXTROSE MONOHYDRATE 25 G/50ML
25 INJECTION, SOLUTION INTRAVENOUS
Status: DISCONTINUED | OUTPATIENT
Start: 2024-12-03 | End: 2024-12-06 | Stop reason: HOSPADM

## 2024-12-03 RX ORDER — LIDOCAINE HYDROCHLORIDE 20 MG/ML
INJECTION, SOLUTION EPIDURAL; INFILTRATION; INTRACAUDAL; PERINEURAL AS NEEDED
Status: DISCONTINUED | OUTPATIENT
Start: 2024-12-03 | End: 2024-12-03 | Stop reason: SURG

## 2024-12-03 RX ORDER — NICOTINE POLACRILEX 4 MG
15 LOZENGE BUCCAL
Status: DISCONTINUED | OUTPATIENT
Start: 2024-12-03 | End: 2024-12-06 | Stop reason: HOSPADM

## 2024-12-03 RX ORDER — CEFAZOLIN SODIUM 1 G/3ML
INJECTION, POWDER, FOR SOLUTION INTRAMUSCULAR; INTRAVENOUS AS NEEDED
Status: DISCONTINUED | OUTPATIENT
Start: 2024-12-03 | End: 2024-12-03 | Stop reason: SURG

## 2024-12-03 RX ORDER — BISACODYL 10 MG
10 SUPPOSITORY, RECTAL RECTAL DAILY PRN
Status: DISCONTINUED | OUTPATIENT
Start: 2024-12-03 | End: 2024-12-06 | Stop reason: HOSPADM

## 2024-12-03 RX ORDER — SODIUM CHLORIDE 0.9 % (FLUSH) 0.9 %
10 SYRINGE (ML) INJECTION EVERY 12 HOURS SCHEDULED
Status: DISCONTINUED | OUTPATIENT
Start: 2024-12-03 | End: 2024-12-06 | Stop reason: HOSPADM

## 2024-12-03 RX ORDER — POLYETHYLENE GLYCOL 3350 17 G/17G
17 POWDER, FOR SOLUTION ORAL DAILY PRN
Status: DISCONTINUED | OUTPATIENT
Start: 2024-12-03 | End: 2024-12-06 | Stop reason: HOSPADM

## 2024-12-03 RX ORDER — SODIUM CHLORIDE 9 MG/ML
75 INJECTION, SOLUTION INTRAVENOUS CONTINUOUS
Status: DISCONTINUED | OUTPATIENT
Start: 2024-12-03 | End: 2024-12-06 | Stop reason: HOSPADM

## 2024-12-03 RX ORDER — SODIUM CHLORIDE 0.9 % (FLUSH) 0.9 %
3 SYRINGE (ML) INJECTION EVERY 12 HOURS SCHEDULED
Status: DISCONTINUED | OUTPATIENT
Start: 2024-12-03 | End: 2024-12-03 | Stop reason: HOSPADM

## 2024-12-03 RX ORDER — ROCURONIUM BROMIDE 10 MG/ML
INJECTION, SOLUTION INTRAVENOUS AS NEEDED
Status: DISCONTINUED | OUTPATIENT
Start: 2024-12-03 | End: 2024-12-03 | Stop reason: SURG

## 2024-12-03 RX ORDER — ONDANSETRON 2 MG/ML
4 INJECTION INTRAMUSCULAR; INTRAVENOUS EVERY 6 HOURS PRN
Status: DISCONTINUED | OUTPATIENT
Start: 2024-12-03 | End: 2024-12-06 | Stop reason: HOSPADM

## 2024-12-03 RX ORDER — SODIUM CHLORIDE 0.9 % (FLUSH) 0.9 %
10 SYRINGE (ML) INJECTION AS NEEDED
Status: DISCONTINUED | OUTPATIENT
Start: 2024-12-03 | End: 2024-12-06 | Stop reason: HOSPADM

## 2024-12-03 RX ORDER — FENTANYL CITRATE 50 UG/ML
INJECTION, SOLUTION INTRAMUSCULAR; INTRAVENOUS AS NEEDED
Status: DISCONTINUED | OUTPATIENT
Start: 2024-12-03 | End: 2024-12-03 | Stop reason: SURG

## 2024-12-03 RX ORDER — FLUMAZENIL 0.1 MG/ML
0.2 INJECTION INTRAVENOUS AS NEEDED
Status: DISCONTINUED | OUTPATIENT
Start: 2024-12-03 | End: 2024-12-03 | Stop reason: HOSPADM

## 2024-12-03 RX ORDER — CHLORHEXIDINE GLUCONATE 500 MG/1
1 CLOTH TOPICAL ONCE
Status: DISCONTINUED | OUTPATIENT
Start: 2024-12-03 | End: 2024-12-06 | Stop reason: HOSPADM

## 2024-12-03 RX ORDER — ACETAMINOPHEN 500 MG
1000 TABLET ORAL ONCE
Status: COMPLETED | OUTPATIENT
Start: 2024-12-03 | End: 2024-12-03

## 2024-12-03 RX ORDER — PROPOFOL 10 MG/ML
VIAL (ML) INTRAVENOUS AS NEEDED
Status: DISCONTINUED | OUTPATIENT
Start: 2024-12-03 | End: 2024-12-03 | Stop reason: SURG

## 2024-12-03 RX ORDER — HYDROCODONE BITARTRATE AND ACETAMINOPHEN 7.5; 325 MG/1; MG/1
1 TABLET ORAL ONCE AS NEEDED
Status: DISCONTINUED | OUTPATIENT
Start: 2024-12-03 | End: 2024-12-03 | Stop reason: HOSPADM

## 2024-12-03 RX ORDER — CHLORHEXIDINE GLUCONATE 500 MG/1
1 CLOTH TOPICAL EVERY 24 HOURS
Status: DISCONTINUED | OUTPATIENT
Start: 2024-12-04 | End: 2024-12-06 | Stop reason: HOSPADM

## 2024-12-03 RX ORDER — ONDANSETRON 2 MG/ML
4 INJECTION INTRAMUSCULAR; INTRAVENOUS
Status: DISCONTINUED | OUTPATIENT
Start: 2024-12-03 | End: 2024-12-03 | Stop reason: HOSPADM

## 2024-12-03 RX ORDER — BUPIVACAINE HCL/0.9 % NACL/PF 0.125 %
PLASTIC BAG, INJECTION (ML) EPIDURAL AS NEEDED
Status: DISCONTINUED | OUTPATIENT
Start: 2024-12-03 | End: 2024-12-03 | Stop reason: SURG

## 2024-12-03 RX ORDER — BISACODYL 10 MG
10 SUPPOSITORY, RECTAL RECTAL DAILY PRN
Status: DISCONTINUED | OUTPATIENT
Start: 2024-12-03 | End: 2024-12-03 | Stop reason: SDUPTHER

## 2024-12-03 RX ORDER — HYDROMORPHONE HYDROCHLORIDE 1 MG/ML
0.2 INJECTION, SOLUTION INTRAMUSCULAR; INTRAVENOUS; SUBCUTANEOUS
Status: DISCONTINUED | OUTPATIENT
Start: 2024-12-03 | End: 2024-12-03 | Stop reason: HOSPADM

## 2024-12-03 RX ORDER — AMOXICILLIN 250 MG
2 CAPSULE ORAL 2 TIMES DAILY PRN
Status: DISCONTINUED | OUTPATIENT
Start: 2024-12-03 | End: 2024-12-03 | Stop reason: SDUPTHER

## 2024-12-03 RX ORDER — AMOXICILLIN 250 MG
2 CAPSULE ORAL 2 TIMES DAILY
Status: DISCONTINUED | OUTPATIENT
Start: 2024-12-03 | End: 2024-12-06 | Stop reason: HOSPADM

## 2024-12-03 RX ORDER — POLYETHYLENE GLYCOL 3350 17 G/17G
17 POWDER, FOR SOLUTION ORAL DAILY PRN
Status: DISCONTINUED | OUTPATIENT
Start: 2024-12-03 | End: 2024-12-03 | Stop reason: SDUPTHER

## 2024-12-03 RX ORDER — EPHEDRINE SULFATE 50 MG/ML
INJECTION INTRAVENOUS AS NEEDED
Status: DISCONTINUED | OUTPATIENT
Start: 2024-12-03 | End: 2024-12-03 | Stop reason: SURG

## 2024-12-03 RX ORDER — BISACODYL 5 MG/1
5 TABLET, DELAYED RELEASE ORAL DAILY PRN
Status: DISCONTINUED | OUTPATIENT
Start: 2024-12-03 | End: 2024-12-03 | Stop reason: SDUPTHER

## 2024-12-03 RX ORDER — LEVETIRACETAM 500 MG/5ML
1000 INJECTION, SOLUTION, CONCENTRATE INTRAVENOUS EVERY 12 HOURS SCHEDULED
Status: DISCONTINUED | OUTPATIENT
Start: 2024-12-03 | End: 2024-12-05

## 2024-12-03 RX ORDER — LABETALOL HYDROCHLORIDE 5 MG/ML
10 INJECTION, SOLUTION INTRAVENOUS
Status: DISCONTINUED | OUTPATIENT
Start: 2024-12-03 | End: 2024-12-06 | Stop reason: HOSPADM

## 2024-12-03 RX ORDER — INSULIN LISPRO 100 [IU]/ML
2-9 INJECTION, SOLUTION INTRAVENOUS; SUBCUTANEOUS
Status: DISCONTINUED | OUTPATIENT
Start: 2024-12-04 | End: 2024-12-06

## 2024-12-03 RX ORDER — IBUPROFEN 600 MG/1
1 TABLET ORAL
Status: DISCONTINUED | OUTPATIENT
Start: 2024-12-03 | End: 2024-12-06 | Stop reason: HOSPADM

## 2024-12-03 RX ORDER — DEXAMETHASONE SODIUM PHOSPHATE 4 MG/ML
INJECTION, SOLUTION INTRA-ARTICULAR; INTRALESIONAL; INTRAMUSCULAR; INTRAVENOUS; SOFT TISSUE AS NEEDED
Status: DISCONTINUED | OUTPATIENT
Start: 2024-12-03 | End: 2024-12-03 | Stop reason: SURG

## 2024-12-03 RX ORDER — LABETALOL HYDROCHLORIDE 5 MG/ML
5 INJECTION, SOLUTION INTRAVENOUS
Status: DISCONTINUED | OUTPATIENT
Start: 2024-12-03 | End: 2024-12-03 | Stop reason: HOSPADM

## 2024-12-03 RX ADMIN — CEFAZOLIN 1 G: 330 INJECTION, POWDER, FOR SOLUTION INTRAMUSCULAR; INTRAVENOUS at 17:14

## 2024-12-03 RX ADMIN — LEVETIRACETAM 1000 MG: 100 INJECTION INTRAVENOUS at 16:08

## 2024-12-03 RX ADMIN — Medication 10 ML: at 20:07

## 2024-12-03 RX ADMIN — Medication 100 MCG: at 17:24

## 2024-12-03 RX ADMIN — ACETAMINOPHEN 1000 MG: 500 TABLET, FILM COATED ORAL at 16:53

## 2024-12-03 RX ADMIN — SODIUM CHLORIDE 75 ML/HR: 9 INJECTION, SOLUTION INTRAVENOUS at 20:07

## 2024-12-03 RX ADMIN — FENTANYL CITRATE 100 MCG: 50 INJECTION, SOLUTION INTRAMUSCULAR; INTRAVENOUS at 17:03

## 2024-12-03 RX ADMIN — ESMOLOL HYDROCHLORIDE 20 MG: 10 INJECTION, SOLUTION INTRAVENOUS at 18:43

## 2024-12-03 RX ADMIN — SODIUM CHLORIDE, POTASSIUM CHLORIDE, SODIUM LACTATE AND CALCIUM CHLORIDE 100 ML/HR: 600; 310; 30; 20 INJECTION, SOLUTION INTRAVENOUS at 16:54

## 2024-12-03 RX ADMIN — FENTANYL CITRATE 100 MCG: 50 INJECTION, SOLUTION INTRAMUSCULAR; INTRAVENOUS at 17:23

## 2024-12-03 RX ADMIN — ESMOLOL HYDROCHLORIDE 20 MG: 10 INJECTION, SOLUTION INTRAVENOUS at 18:29

## 2024-12-03 RX ADMIN — Medication 100 MCG: at 17:46

## 2024-12-03 RX ADMIN — EPHEDRINE SULFATE 20 MG: 50 INJECTION INTRAVENOUS at 17:54

## 2024-12-03 RX ADMIN — Medication 100 MCG: at 17:30

## 2024-12-03 RX ADMIN — SUGAMMADEX 200 MG: 100 INJECTION, SOLUTION INTRAVENOUS at 18:22

## 2024-12-03 RX ADMIN — ROCURONIUM 50 MG: 50 INJECTION, SOLUTION INTRAVENOUS at 17:03

## 2024-12-03 RX ADMIN — DOCUSATE SODIUM 50 MG AND SENNOSIDES 8.6 MG 2 TABLET: 8.6; 5 TABLET, FILM COATED ORAL at 20:09

## 2024-12-03 RX ADMIN — Medication 1 APPLICATION: at 20:07

## 2024-12-03 RX ADMIN — PROPOFOL 100 MG: 10 INJECTION, EMULSION INTRAVENOUS at 17:03

## 2024-12-03 RX ADMIN — PROPOFOL 40 MG: 10 INJECTION, EMULSION INTRAVENOUS at 17:58

## 2024-12-03 RX ADMIN — ONDANSETRON 4 MG: 2 INJECTION INTRAMUSCULAR; INTRAVENOUS at 18:58

## 2024-12-03 RX ADMIN — NICARDIPINE HYDROCHLORIDE 5 MG/HR: 0.1 INJECTION INTRAVENOUS at 18:50

## 2024-12-03 RX ADMIN — DEXAMETHASONE SODIUM PHOSPHATE 4 MG: 4 INJECTION, SOLUTION INTRAMUSCULAR; INTRAVENOUS at 17:43

## 2024-12-03 RX ADMIN — FENTANYL CITRATE 50 MCG: 50 INJECTION, SOLUTION INTRAMUSCULAR; INTRAVENOUS at 17:58

## 2024-12-03 RX ADMIN — Medication 50 MCG: at 17:15

## 2024-12-03 RX ADMIN — ONDANSETRON 4 MG: 2 INJECTION INTRAMUSCULAR; INTRAVENOUS at 17:43

## 2024-12-03 RX ADMIN — LIDOCAINE HYDROCHLORIDE 100 MG: 20 INJECTION, SOLUTION EPIDURAL; INFILTRATION; INTRACAUDAL; PERINEURAL at 17:03

## 2024-12-03 NOTE — ANESTHESIA PROCEDURE NOTES
Arterial Line    Pre-sedation assessment completed: 12/3/2024 4:53 PM    Patient reassessed immediately prior to procedure    Patient location during procedure: pre-op  Start time: 12/3/2024 4:53 PM  Stop Time:12/3/2024 4:53 PM       Line placed for hemodynamic monitoring.  Performed By   Anesthesiologist: Erika Bellamy MD   Preanesthetic Checklist  Completed: patient identified, IV checked, site marked, risks and benefits discussed, surgical consent, monitors and equipment checked, pre-op evaluation and timeout performed  Arterial Line Prep    Sterile Tech: cap, gloves and mask  Prep: ChloraPrep  Patient monitoring: blood pressure monitoring, continuous pulse oximetry and EKG  Arterial Line Procedure   Laterality:right  Location:  radial artery  Catheter size: 20 G   Guidance: ultrasound guided  PROCEDURE NOTE/ULTRASOUND INTERPRETATION.  Using ultrasound guidance the potential vascular sites for insertion of the catheter were visualized to determine the patency of the vessel to be used for vascular access.  After selecting the appropriate site for insertion, the needle was visualized under ultrasound being inserted into the radial artery, followed by ultrasound confirmation of wire and catheter placement. There were no abnormalities seen on ultrasound; an image was taken; and the patient tolerated the procedure with no complications.   Number of attempts: 1  Successful placement: yes Images: still images not obtained  Post Assessment   Dressing Type: secured with tape and wrist guard applied.   Complications no  Circ/Move/Sens Assessment: normal and unchanged.   Patient Tolerance: patient tolerated the procedure well with no apparent complications

## 2024-12-03 NOTE — CONSULTS
Reason for consult: Subdural hematoma    Chief Complaint   Patient presents with    Numbness     left         Requesting provider: Dr. Marco Antonio Yates    HPI: This is a 84-year-old female patient who presented today to the emergency room with a complaint of numbness in her left hand and weakness.  She also stated that she had some numbness around her lips but this has resolved as well.  The patient does have a history of sustaining a fall back on October 19, 2024 where she lost her balance and hit her head on the concrete.  There was no loss of consciousness at that time.  She did have a laceration which was closed with staples which was also removed on October 29, 2024.  She did have imaging of her head done on October 19, 2024 which did not show any concerns for subdural hematoma.  She comes in today with the numbness complaints and had a repeat CT scan of her head which did show a right frontal subdural hematoma with mass effect but no slight shift.  She says the numbness has been going on since December 2, 2024.  She did have some facial numbness this morning but this is already resolved.  She does feel like he is having weakness in the left hand as well and having difficulty with coordination.  She is right-hand dominant.    Review of Systems   Constitutional:  Positive for activity change. Negative for fever.   Musculoskeletal: Negative.    Neurological:  Positive for weakness and numbness.   All other systems reviewed and are negative.       Pertinent positives/negatives documented in HPI.  All other systems reviewed and negative.    Past Medical History:  has a past medical history of Diabetes mellitus, GERD (gastroesophageal reflux disease), and Hypertension.    Past Surgical History:  has a past surgical history that includes Cholecystectomy; Frederick's Neuroma Excision (Left); and Hemorrhoid surgery.    Family History: family history includes COPD in her father; Diabetes in her mother; Heart disease in her father  "and mother; Hypertension in her father and mother.    Social History:  reports that she has quit smoking. Her smoking use included cigarettes. She has never used smokeless tobacco. She reports that she does not drink alcohol and does not use drugs.    Allergies: Sulfa antibiotics    Medications: Scheduled Meds:   Continuous Infusions:   PRN Meds:.  sodium chloride     Objective:  General Appearance:  Comfortable, well-appearing, in no acute distress and not in pain.    Vital signs: (most recent): Blood pressure 140/66, pulse 69, temperature 98.6 °F (37 °C), temperature source Oral, resp. rate 16, height 154.9 cm (61\"), weight 78.5 kg (173 lb 1 oz), SpO2 100%.  Vital signs are normal.  No fever.    Output: Producing urine.    HEENT: Normal HEENT exam.    Lungs:  Normal effort and normal respiratory rate.  She is not in respiratory distress.    Heart: Normal rate.  Regular rhythm.    Chest: Symmetric chest wall expansion.   Extremities: Normal range of motion.    Skin:  Warm and dry.    Abdomen: Abdomen is soft and non-distended.  Bowel sounds are normal.   There is no abdominal tenderness.     Pulses: Distal pulses are intact.        Neurological Exam  Mental Status  Awake and alert. Oriented to person, place and time. Oriented to person, place, and time. Speech is normal. Language is fluent with no aphasia. Attention and concentration are normal.    Cranial Nerves  CN I: Sense of smell is normal.  CN II: Right normal visual field. Left normal visual field.  CN III, IV, VI: Extraocular movements intact bilaterally. Normal lids and orbits bilaterally. Pupils equal round and reactive to light bilaterally.  CN V: Facial sensation is normal.  CN VII:  Right: There is no facial weakness.  Left: There is no facial weakness.  CN XI: Shoulder shrug strength is normal.  CN XII: Tongue midline without atrophy or fasciculations.    Motor  Normal muscle bulk throughout. Normal muscle tone. Strength is 5/5 throughout all four " extremities.    Sensory  Sensation is intact to light touch, pinprick, vibration and proprioception in all four extremities.    Reflexes  Deep tendon reflexes are 2+ and symmetric in all four extremities.    Coordination  Right: Rapid alternating movement normal.Left: Rapid alternating movement abnormality:      Vital Signs  Temp:  [98 °F (36.7 °C)-98.6 °F (37 °C)] 98.6 °F (37 °C)  Heart Rate:  [69-72] 69  Resp:  [16-18] 16  BP: (140-156)/(66-84) 140/66    Physical Exam  Constitutional:       General: She is awake. Vital signs are normal.      Appearance: Normal appearance. She is well-developed.   HENT:      Head: Normocephalic.   Eyes:      General: Lids are normal.      Extraocular Movements: Extraocular movements intact.      Conjunctiva/sclera: Conjunctivae normal.      Pupils: Pupils are equal, round, and reactive to light.   Cardiovascular:      Rate and Rhythm: Normal rate and regular rhythm.      Pulses: Intact distal pulses.   Pulmonary:      Effort: Pulmonary effort is normal. No respiratory distress.      Breath sounds: Normal breath sounds.   Abdominal:      General: Bowel sounds are normal. There is no distension.      Palpations: Abdomen is soft.   Musculoskeletal:         General: Normal range of motion.      Cervical back: Normal range of motion.   Skin:     General: Skin is warm and dry.   Neurological:      Mental Status: She is alert and oriented to person, place, and time.      GCS: GCS eye subscore is 4. GCS verbal subscore is 5. GCS motor subscore is 6.      Cranial Nerves: No cranial nerve deficit.      Sensory: No sensory deficit.      Motor: Motor strength is normal.     Coordination: Impaired rapid alternating movements.      Deep Tendon Reflexes: Reflexes are normal and symmetric. Reflexes normal.   Psychiatric:         Speech: Speech normal.         Behavior: Behavior normal.         Thought Content: Thought content normal.         Results Review:   I reviewed the patient's new clinical  results.  I reviewed the patient's new imaging results and agree with the interpretation.  I reviewed the patient's other test results and agree with the interpretation          Lab Results (last 24 hours)       Procedure Component Value Units Date/Time    Protime-INR [[id number]] Collected: 12/03/24 1330    Specimen: Blood Updated: 12/03/24 1528    aPTT [[id number]] Collected: 12/03/24 1330    Specimen: Blood Updated: 12/03/24 1528    Magnesium [[id number]]  (Normal) Collected: 12/03/24 1330    Specimen: Blood Updated: 12/03/24 1410     Magnesium 1.6 mg/dL     Single High Sensitivity Troponin T [[id number]]  (Normal) Collected: 12/03/24 1330    Specimen: Blood Updated: 12/03/24 1409     HS Troponin T <6 ng/L     Narrative:      High Sensitive Troponin T Reference Range:  <14.0 ng/L- Negative Female for AMI  <22.0 ng/L- Negative Male for AMI  >=14 - Abnormal Female indicating possible myocardial injury.  >=22 - Abnormal Male indicating possible myocardial injury.   Clinicians would have to utilize clinical acumen, EKG, Troponin, and serial changes to determine if it is an Acute Myocardial Infarction or myocardial injury due to an underlying chronic condition.         Comprehensive Metabolic Panel [[id number]]  (Abnormal) Collected: 12/03/24 1330    Specimen: Blood Updated: 12/03/24 1400     Glucose 136 mg/dL      BUN 9 mg/dL      Creatinine 0.62 mg/dL      Sodium 132 mmol/L      Potassium 4.2 mmol/L      Chloride 91 mmol/L      CO2 29.0 mmol/L      Calcium 8.7 mg/dL      Total Protein 6.8 g/dL      Albumin 3.7 g/dL      ALT (SGPT) 8 U/L      AST (SGOT) 14 U/L      Alkaline Phosphatase 106 U/L      Total Bilirubin 0.7 mg/dL      Globulin 3.1 gm/dL      A/G Ratio 1.2 g/dL      BUN/Creatinine Ratio 14.5     Anion Gap 12.0 mmol/L      eGFR 87.9 mL/min/1.73     Narrative:      GFR Normal >60  Chronic Kidney Disease <60  Kidney Failure <15    The GFR formula is only valid for adults with stable renal function between ages 18  and 70.    [place] Draw [[id number]] Collected: 12/03/24 1330    Specimen: Blood Updated: 12/03/24 1345    Narrative:      The following orders were created for panel order [place] Draw.  Procedure                               Abnormality         Status                     ---------                               -----------         ------                     Green Top (Gel)[[id number]]                                  Final result               Lavender Top[[id number]]                                     Final result               Red Top[[id number]]                                          Final result               Alonso Top[[id number]]                                         Final result               Light Blue Top[[id number]]                                   Final result                 Please view results for these tests on the individual orders.    Green Top (Gel) [[id number]] Collected: 12/03/24 1330    Specimen: Blood Updated: 12/03/24 1345     Extra Tube Hold for add-ons.     Comment: Auto resulted.       Lavender Top [[id number]] Collected: 12/03/24 1330    Specimen: Blood Updated: 12/03/24 1345     Extra Tube hold for add-on     Comment: Auto resulted       Red Top [[id number]] Collected: 12/03/24 1330    Specimen: Blood Updated: 12/03/24 1345     Extra Tube Hold for add-ons.     Comment: Auto resulted.       Gray Top [[id number]] Collected: 12/03/24 1330    Specimen: Blood Updated: 12/03/24 1345     Extra Tube Hold for add-ons.     Comment: Auto resulted.       Light Blue Top [[id number]] Collected: 12/03/24 1330    Specimen: Blood Updated: 12/03/24 1345     Extra Tube Hold for add-ons.     Comment: Auto resulted       CBC & Differential [[id number]]  (Normal) Collected: 12/03/24 1330    Specimen: Blood Updated: 12/03/24 1340    Narrative:      The following orders were created for panel order CBC & Differential.  Procedure                               Abnormality         Status                     ---------                                -----------         ------                     CBC Auto Differential[[id number]]        Normal              Final result                 Please view results for these tests on the individual orders.    CBC Auto Differential [[id number]]  (Normal) Collected: 12/03/24 1330    Specimen: Blood Updated: 12/03/24 1340     WBC 7.73 10*3/mm3      RBC 4.50 10*6/mm3      Hemoglobin 13.8 g/dL      Hematocrit 43.3 %      MCV 96.2 fL      MCH 30.7 pg      MCHC 31.9 g/dL      RDW 13.4 %      RDW-SD 47.6 fl      MPV 9.6 fL      Platelets 219 10*3/mm3      Neutrophil % 63.3 %      Lymphocyte % 25.7 %      Monocyte % 8.4 %      Eosinophil % 1.8 %      Basophil % 0.5 %      Immature Grans % 0.3 %      Neutrophils, Absolute 4.89 10*3/mm3      Lymphocytes, Absolute 1.99 10*3/mm3      Monocytes, Absolute 0.65 10*3/mm3      Eosinophils, Absolute 0.14 10*3/mm3      Basophils, Absolute 0.04 10*3/mm3      Immature Grans, Absolute 0.02 10*3/mm3      nRBC 0.0 /100 WBC           CT scan of the head that was done on December 3, 2024 shows a right frontal chronic subdural hematoma that is causing mass effect and minimal midline shift of 2 to 3 mm.        Assessment/Plan:   The patient does have a subdural hematoma.  She is can to be admitted to the intensivist.  We will start her on Keppra.  The patient is symptomatic from the subdural hematoma and will need to go to the operating room tonight for a bur hole on the right to relieve the subdural hematoma.  Dr. Bailey did review the imaging and discussed this with the patient.  The visit was conducted with Dr. Bailey along with the exam.  The patient's questions and concerns were addressed.  Will get the patient to the operating room as quickly as possible.    Hospital diagnosis:  Subdural hematoma  Seizure      I discussed the patient's findings and my recommendations with patient, family, nursing staff, and consulting provider    Terell Mayfield, YVONNE  12/03/24  15:30  CST    I spent 48 minutes caring for Rosa on this date of service. This time includes time spent by me in the following activities: preparing for the visit, reviewing tests, performing a medically appropriate examination and/or evaluation, counseling and educating the patient/family/caregiver, referring and communicating with other health care professionals, documenting information in the medical record, independently interpreting results and communicating that information with the patient/family/caregiver, ordering medications, and obtaining a separately obtained history

## 2024-12-03 NOTE — ANESTHESIA PREPROCEDURE EVALUATION
Anesthesia Evaluation     NPO Solid Status: > 8 hours  NPO Liquid Status: > 8 hours           Airway   Mallampati: I  No difficulty expected  Dental      Pulmonary    (+) a smoker Former,  Cardiovascular   Exercise tolerance: poor (<4 METS)    (+) hypertension      Neuro/Psych    ROS Comment: Fall on concrete mid October  Now with numbness in hand, found to have subdural hematoma  GI/Hepatic/Renal/Endo    (+) obesity, GERD, diabetes mellitus type 2    Musculoskeletal     Abdominal    Substance History      OB/GYN          Other                          Anesthesia Plan    ASA 3 - emergent     general     intravenous induction     Anesthetic plan, risks, benefits, and alternatives have been provided, discussed and informed consent has been obtained with: patient.        CODE STATUS:

## 2024-12-03 NOTE — NURSING NOTE
"Patient states that she fell and hit her head 6 weeks ago and at that time she had a CT scan that was negative for a brain bleed . She states \"I have a brain bleed today,but I feel fine\".   "

## 2024-12-03 NOTE — ED PROVIDER NOTES
Subjective   History of Present Illness  84-year-old female states that she woke up yesterday with numbness of her left hand.  And she has weakness of her left hand.  No other extremity weakness.  No other numbness and tingling.  She states that she did have some numbness around her lips but that has resolved.  She has no headache.  No visual problems.  No speech problems.  No confusion.  Patient denies any other symptoms at this time.      Review of Systems   Neurological:  Positive for numbness.   All other systems reviewed and are negative.      Past Medical History:   Diagnosis Date    Diabetes mellitus     GERD (gastroesophageal reflux disease)     Hypertension        Allergies   Allergen Reactions    Sulfa Antibiotics Rash       Past Surgical History:   Procedure Laterality Date    CHOLECYSTECTOMY      HEMORRHOIDECTOMY      CORTEZ'S NEUROMA EXCISION Left        Family History   Problem Relation Age of Onset    Heart disease Mother     Diabetes Mother     Hypertension Mother     COPD Father     Heart disease Father     Hypertension Father        Social History     Socioeconomic History    Marital status:    Tobacco Use    Smoking status: Former     Types: Cigarettes    Smokeless tobacco: Never    Tobacco comments:     Social smoker when young    Vaping Use    Vaping status: Never Used   Substance and Sexual Activity    Alcohol use: Never    Drug use: Never    Sexual activity: Defer           Objective   Physical Exam  Vitals and nursing note reviewed.   Constitutional:       Appearance: Normal appearance.   HENT:      Head: Normocephalic and atraumatic.      Mouth/Throat:      Mouth: Mucous membranes are moist.   Eyes:      Extraocular Movements: Extraocular movements intact.      Pupils: Pupils are equal, round, and reactive to light.   Cardiovascular:      Rate and Rhythm: Normal rate and regular rhythm.      Heart sounds: Normal heart sounds.   Pulmonary:      Effort: Pulmonary effort is normal.       Breath sounds: Normal breath sounds.   Abdominal:      General: Bowel sounds are normal.      Palpations: Abdomen is soft.      Tenderness: There is no abdominal tenderness.   Skin:     General: Skin is warm and dry.   Neurological:      General: No focal deficit present.      Mental Status: She is alert and oriented to person, place, and time.      Cranial Nerves: No cranial nerve deficit.      Sensory: Sensory deficit present.      Motor: Weakness present.      Coordination: Coordination normal.   Psychiatric:         Mood and Affect: Mood normal.         Behavior: Behavior normal.         Procedures           ED Course  ED Course as of 12/03/24 1559   Tue Dec 03, 2024   1427 EKG rate 67  Normal sinus rhythm  No STEMI [RP]      ED Course User Index  [RP] Marco Antonio Yates MD                                                     Lab Results (last 24 hours)       Procedure Component Value Units Date/Time    Comprehensive Metabolic Panel [[id number]]  (Abnormal) Collected: 12/03/24 1330    Specimen: Blood Updated: 12/03/24 1400     Glucose 136 mg/dL      BUN 9 mg/dL      Creatinine 0.62 mg/dL      Sodium 132 mmol/L      Potassium 4.2 mmol/L      Chloride 91 mmol/L      CO2 29.0 mmol/L      Calcium 8.7 mg/dL      Total Protein 6.8 g/dL      Albumin 3.7 g/dL      ALT (SGPT) 8 U/L      AST (SGOT) 14 U/L      Alkaline Phosphatase 106 U/L      Total Bilirubin 0.7 mg/dL      Globulin 3.1 gm/dL      A/G Ratio 1.2 g/dL      BUN/Creatinine Ratio 14.5     Anion Gap 12.0 mmol/L      eGFR 87.9 mL/min/1.73     Narrative:      GFR Normal >60  Chronic Kidney Disease <60  Kidney Failure <15    The GFR formula is only valid for adults with stable renal function between ages 18 and 70.    CBC & Differential [[id number]]  (Normal) Collected: 12/03/24 1330    Specimen: Blood Updated: 12/03/24 1340    Narrative:      The following orders were created for panel order CBC & Differential.  Procedure                               Abnormality          Status                     ---------                               -----------         ------                     CBC Auto Differential[[id number]]        Normal              Final result                 Please view results for these tests on the individual orders.    CBC Auto Differential [[id number]]  (Normal) Collected: 12/03/24 1330    Specimen: Blood Updated: 12/03/24 1340     WBC 7.73 10*3/mm3      RBC 4.50 10*6/mm3      Hemoglobin 13.8 g/dL      Hematocrit 43.3 %      MCV 96.2 fL      MCH 30.7 pg      MCHC 31.9 g/dL      RDW 13.4 %      RDW-SD 47.6 fl      MPV 9.6 fL      Platelets 219 10*3/mm3      Neutrophil % 63.3 %      Lymphocyte % 25.7 %      Monocyte % 8.4 %      Eosinophil % 1.8 %      Basophil % 0.5 %      Immature Grans % 0.3 %      Neutrophils, Absolute 4.89 10*3/mm3      Lymphocytes, Absolute 1.99 10*3/mm3      Monocytes, Absolute 0.65 10*3/mm3      Eosinophils, Absolute 0.14 10*3/mm3      Basophils, Absolute 0.04 10*3/mm3      Immature Grans, Absolute 0.02 10*3/mm3      nRBC 0.0 /100 WBC     Magnesium [[id number]]  (Normal) Collected: 12/03/24 1330    Specimen: Blood Updated: 12/03/24 1410     Magnesium 1.6 mg/dL     Single High Sensitivity Troponin T [[id number]]  (Normal) Collected: 12/03/24 1330    Specimen: Blood Updated: 12/03/24 1409     HS Troponin T <6 ng/L     Narrative:      High Sensitive Troponin T Reference Range:  <14.0 ng/L- Negative Female for AMI  <22.0 ng/L- Negative Male for AMI  >=14 - Abnormal Female indicating possible myocardial injury.  >=22 - Abnormal Male indicating possible myocardial injury.   Clinicians would have to utilize clinical acumen, EKG, Troponin, and serial changes to determine if it is an Acute Myocardial Infarction or myocardial injury due to an underlying chronic condition.         Protime-INR [[id number]]  (Normal) Collected: 12/03/24 1330    Specimen: Blood Updated: 12/03/24 1534     Protime 13.6 Seconds      INR 1.00    aPTT [[id number]]  (Normal) Collected:  12/03/24 1330    Specimen: Blood Updated: 12/03/24 1534     PTT 31.8 seconds               CT Head Without Contrast   Final Result   1. Right-sided extra-axial hemorrhage with mild mass effect.   2. This is a mostly low density collection though is new as compared   with the CT from 6 weeks ago.       This report was signed and finalized on 12/3/2024 3:08 PM by Dr. Jax Gallardo MD.            Medications   sodium chloride 0.9 % flush 10 mL (has no administration in time range)       Medical Decision Making  84-year-old female came in here with left hand numbness and left hand weakness.  Patient was found to have a subdural hemorrhage with a midline shift.  Neurosurgery was consulted.  They are recommending taken the patient to the operating room.  Patient states that she did have a fall 6 weeks ago.  She was evaluated here in the emergency room and CT scans at that time were negative for any acute findings and patient was discharged home.    Problems Addressed:  Subdural hemorrhage: complicated acute illness or injury    Amount and/or Complexity of Data Reviewed  Labs: ordered. Decision-making details documented in ED Course.  Radiology: ordered. Decision-making details documented in ED Course.  ECG/medicine tests: ordered. Decision-making details documented in ED Course.  Discussion of management or test interpretation with external provider(s): Dr. Bailey -neurosurgery    Risk  Prescription drug management.        Final diagnoses:   Subdural hemorrhage       ED Disposition  ED Disposition       ED Disposition   Send to OR    Condition   --    Comment   --               No follow-up provider specified.       Medication List      No changes were made to your prescriptions during this visit.            Marco Antonio Yates MD  12/03/24 1600

## 2024-12-03 NOTE — ANESTHESIA PROCEDURE NOTES
Airway  Urgency: elective    Date/Time: 12/3/2024 5:03 PM  Airway not difficult    General Information and Staff    Patient location during procedure: OR    Indications and Patient Condition  Indications for airway management: airway protection    Preoxygenated: yes  Mask difficulty assessment: 1 - vent by mask    Final Airway Details  Final airway type: endotracheal airway      Successful airway: ETT  Cuffed: yes   Successful intubation technique: direct laryngoscopy  Endotracheal tube insertion site: oral  Blade: Sampson  Blade size: 2  ETT size (mm): 7.0  Cormack-Lehane Classification: grade I - full view of glottis  Placement verified by: chest auscultation and capnometry   Measured from: teeth  ETT/EBT  to teeth (cm): 21  Number of attempts at approach: 1  Assessment: lips, teeth, and gum same as pre-op and atraumatic intubation

## 2024-12-04 ENCOUNTER — APPOINTMENT (OUTPATIENT)
Dept: CT IMAGING | Facility: HOSPITAL | Age: 84
DRG: 027 | End: 2024-12-04
Payer: MEDICARE

## 2024-12-04 LAB
ANION GAP SERPL CALCULATED.3IONS-SCNC: 10 MMOL/L (ref 5–15)
BUN SERPL-MCNC: 10 MG/DL (ref 8–23)
BUN/CREAT SERPL: 20.4 (ref 7–25)
CALCIUM SPEC-SCNC: 8 MG/DL (ref 8.6–10.5)
CHLORIDE SERPL-SCNC: 98 MMOL/L (ref 98–107)
CO2 SERPL-SCNC: 26 MMOL/L (ref 22–29)
CREAT SERPL-MCNC: 0.49 MG/DL (ref 0.57–1)
DEPRECATED RDW RBC AUTO: 46.3 FL (ref 37–54)
EGFRCR SERPLBLD CKD-EPI 2021: 93.1 ML/MIN/1.73
ERYTHROCYTE [DISTWIDTH] IN BLOOD BY AUTOMATED COUNT: 13.3 % (ref 12.3–15.4)
GLUCOSE BLDC GLUCOMTR-MCNC: 85 MG/DL (ref 70–130)
GLUCOSE BLDC GLUCOMTR-MCNC: 90 MG/DL (ref 70–130)
GLUCOSE BLDC GLUCOMTR-MCNC: 93 MG/DL (ref 70–130)
GLUCOSE BLDC GLUCOMTR-MCNC: 99 MG/DL (ref 70–130)
GLUCOSE SERPL-MCNC: 174 MG/DL (ref 65–99)
HCT VFR BLD AUTO: 35.4 % (ref 34–46.6)
HGB BLD-MCNC: 11.7 G/DL (ref 12–15.9)
MCH RBC QN AUTO: 31.2 PG (ref 26.6–33)
MCHC RBC AUTO-ENTMCNC: 33.1 G/DL (ref 31.5–35.7)
MCV RBC AUTO: 94.4 FL (ref 79–97)
PLATELET # BLD AUTO: 161 10*3/MM3 (ref 140–450)
PMV BLD AUTO: 9.8 FL (ref 6–12)
POTASSIUM SERPL-SCNC: 4 MMOL/L (ref 3.5–5.2)
RBC # BLD AUTO: 3.75 10*6/MM3 (ref 3.77–5.28)
SODIUM SERPL-SCNC: 134 MMOL/L (ref 136–145)
WBC NRBC COR # BLD AUTO: 4.83 10*3/MM3 (ref 3.4–10.8)

## 2024-12-04 PROCEDURE — 99024 POSTOP FOLLOW-UP VISIT: CPT | Performed by: NURSE PRACTITIONER

## 2024-12-04 PROCEDURE — 80048 BASIC METABOLIC PNL TOTAL CA: CPT | Performed by: NEUROLOGICAL SURGERY

## 2024-12-04 PROCEDURE — 85027 COMPLETE CBC AUTOMATED: CPT | Performed by: NEUROLOGICAL SURGERY

## 2024-12-04 PROCEDURE — 97162 PT EVAL MOD COMPLEX 30 MIN: CPT

## 2024-12-04 PROCEDURE — 70450 CT HEAD/BRAIN W/O DYE: CPT

## 2024-12-04 PROCEDURE — 82948 REAGENT STRIP/BLOOD GLUCOSE: CPT

## 2024-12-04 PROCEDURE — 25010000002 CEFAZOLIN PER 500 MG: Performed by: NEUROLOGICAL SURGERY

## 2024-12-04 PROCEDURE — 25010000002 LEVETRIRACETAM PER 10 MG: Performed by: NURSE PRACTITIONER

## 2024-12-04 PROCEDURE — 97166 OT EVAL MOD COMPLEX 45 MIN: CPT | Performed by: OCCUPATIONAL THERAPIST

## 2024-12-04 PROCEDURE — 25810000003 SODIUM CHLORIDE 0.9 % SOLUTION: Performed by: NURSE PRACTITIONER

## 2024-12-04 RX ORDER — MONTELUKAST SODIUM 10 MG/1
10 TABLET ORAL NIGHTLY
Status: DISCONTINUED | OUTPATIENT
Start: 2024-12-04 | End: 2024-12-06 | Stop reason: HOSPADM

## 2024-12-04 RX ORDER — ENALAPRIL MALEATE 10 MG/1
20 TABLET ORAL DAILY
Status: DISCONTINUED | OUTPATIENT
Start: 2024-12-04 | End: 2024-12-06 | Stop reason: HOSPADM

## 2024-12-04 RX ORDER — METOPROLOL TARTRATE 25 MG/1
25 TABLET, FILM COATED ORAL EVERY 12 HOURS SCHEDULED
Status: DISCONTINUED | OUTPATIENT
Start: 2024-12-04 | End: 2024-12-06 | Stop reason: HOSPADM

## 2024-12-04 RX ORDER — FAMOTIDINE 20 MG/1
20 TABLET, FILM COATED ORAL NIGHTLY PRN
Status: DISCONTINUED | OUTPATIENT
Start: 2024-12-04 | End: 2024-12-06 | Stop reason: HOSPADM

## 2024-12-04 RX ADMIN — METOPROLOL TARTRATE 25 MG: 25 TABLET, FILM COATED ORAL at 11:53

## 2024-12-04 RX ADMIN — Medication 1 APPLICATION: at 20:20

## 2024-12-04 RX ADMIN — FLUOXETINE HYDROCHLORIDE 20 MG: 20 CAPSULE ORAL at 11:53

## 2024-12-04 RX ADMIN — Medication 10 ML: at 08:56

## 2024-12-04 RX ADMIN — SODIUM CHLORIDE 250 ML: 9 INJECTION, SOLUTION INTRAVENOUS at 23:12

## 2024-12-04 RX ADMIN — CHLORHEXIDINE GLUCONATE 1 APPLICATION: 500 CLOTH TOPICAL at 03:26

## 2024-12-04 RX ADMIN — METOPROLOL TARTRATE 25 MG: 25 TABLET, FILM COATED ORAL at 20:20

## 2024-12-04 RX ADMIN — CEFAZOLIN 2000 MG: 2 INJECTION, POWDER, FOR SOLUTION INTRAMUSCULAR; INTRAVENOUS at 08:55

## 2024-12-04 RX ADMIN — SODIUM CHLORIDE 250 ML: 9 INJECTION, SOLUTION INTRAVENOUS at 22:44

## 2024-12-04 RX ADMIN — CEFAZOLIN 2000 MG: 2 INJECTION, POWDER, FOR SOLUTION INTRAMUSCULAR; INTRAVENOUS at 00:21

## 2024-12-04 RX ADMIN — LEVETIRACETAM 1000 MG: 100 INJECTION INTRAVENOUS at 20:21

## 2024-12-04 RX ADMIN — ENALAPRIL MALEATE 20 MG: 10 TABLET ORAL at 11:53

## 2024-12-04 RX ADMIN — DOCUSATE SODIUM 50 MG AND SENNOSIDES 8.6 MG 2 TABLET: 8.6; 5 TABLET, FILM COATED ORAL at 20:22

## 2024-12-04 RX ADMIN — MONTELUKAST SODIUM 10 MG: 10 TABLET, COATED ORAL at 20:20

## 2024-12-04 RX ADMIN — LEVETIRACETAM 1000 MG: 100 INJECTION INTRAVENOUS at 08:55

## 2024-12-04 RX ADMIN — Medication 1 APPLICATION: at 08:55

## 2024-12-04 RX ADMIN — DOCUSATE SODIUM 50 MG AND SENNOSIDES 8.6 MG 2 TABLET: 8.6; 5 TABLET, FILM COATED ORAL at 08:56

## 2024-12-04 RX ADMIN — Medication 10 ML: at 20:22

## 2024-12-04 NOTE — PLAN OF CARE
Goal Outcome Evaluation:  Plan of Care Reviewed With: patient           Outcome Evaluation: OT eval completed. Pt presents alert and oriented x4, sitting up in bed with drain in place. 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 Today she was able to bring self to sitting at EOB with CGA. Donned socks with set-up and Min A, long sitting up in bed. She required increased assistance with socks due to coordination and sensation deficits in L hand. She was able to perform sit <> stand t/f from EOB with CGA and amb around unit with Min A. She did have two LOB requiring assistance to correct. Pt left sitting up in chair at end of session. She would benefit from skilled OT services to address these deficits. Recommend d/c home with assist and HH.    Anticipated Discharge Disposition (OT): home with assist, home with home health

## 2024-12-04 NOTE — PROGRESS NOTES
[facility] Intens[facility]  Progress Note    Patient Name: Rosa Kunz  Date of Admission: 12/3/2024  Today's Date: 12/04/24  Length of Stay: 1  Primary Care Physician: Jermaine Wolff MD    Subjective     Chief Complaint: Subdural hematoma    HPI:  No acute events overnight after arriving to the ICU from the OR.  Patient with drain in place.  Notes only very mild headache relieved with Tylenol.  No other complaints.    Review of Systems:  All pertinent negatives and positives are as above. All other systems have been reviewed and are negative unless otherwise stated.     ICU Course     Summary:  Mrs. Kunz is an 84-year-old female admitted to [facility] ICU following neurosurgical intervention for right subdural hematoma.  Presented to the ED overnight with left-sided left hand paresthesias.  Workup in the ED included a head CT which showed a right subdural hematoma with midline shift.  Notably the patient did have a fall 6 weeks ago.  Head CT at that time was negative.  Will continue postop neurosurgical care.    Objective      Physical Exam:  General: Well appearing, in no respiratory distress  Head: Postop changes noted with drain in place  Eyes: Conjunctiva clear, sclera non-icteric  Teeth/Gums: Normal inspection  Neck: Supple without stridor  Heart: Regular rate and rhythm, no murmur  Lungs: Clear to auscultation bilaterally, no wheezing  Abdomen: Soft, nontender  MSK/extremities: No cyanosis or edema  Neurologic: AOx3, similar paresthesias of the left hand      Results Review:  Results from last 7 days   Lab Units 12/04/24  0346 12/03/24  1330   SODIUM mmol/L 134* 132*   POTASSIUM mmol/L 4.0 4.2   CHLORIDE mmol/L 98 91*   CO2 mmol/L 26.0 29.0   BUN mg/dL 10 9   CALCIUM mg/dL 8.0* 8.7     Results from last 7 days   Lab Units 12/04/24  0346 12/03/24  1330   WBC 10*3/mm3 4.83 7.73   HEMOGLOBIN g/dL 11.7* 13.8   HEMATOCRIT % 35.4 43.3   PLATELETS 10*3/mm3 161 219     Visit  "Vitals  /56   Pulse 64   Temp 98.1 °F (36.7 °C) (Oral)   Resp 15   Ht 154.9 cm (61\")   Wt 79 kg (174 lb 2.6 oz)   SpO2 93%   BMI 32.91 kg/m²       Medications:  Chlorhexidine Gluconate Cloth, 1 Application, Topical, Once  Chlorhexidine Gluconate Cloth, 1 Application, Topical, Q24H  enalapril, 20 mg, Oral, Daily  FLUoxetine, 20 mg, Oral, Daily  insulin lispro, 2-9 Units, Subcutaneous, 4x Daily AC & at Bedtime  levETIRAcetam, 1,000 mg, Intravenous, Q12H  metoprolol tartrate, 25 mg, Oral, Q12H  montelukast, 10 mg, Oral, Nightly  mupirocin, 1 Application, Each Nare, BID  senna-docusate sodium, 2 tablet, Oral, BID  sodium chloride, 10 mL, Intravenous, Q12H      lactated ringers, 100 mL/hr, Last Rate: Stopped (12/03/24 1900)  niCARdipine, 5-15 mg/hr, Last Rate: Stopped (12/03/24 2000)  sodium chloride, 75 mL/hr, Last Rate: 75 mL/hr (12/03/24 2007)          Assessment/Plan     Problems:  Subdural hematoma with midline shift  Status post right craniotomy  Left hand paresthesias  Diabetes  Hypertension  GERD     Plan:  Subdural hematoma, status post right craniotomy  -Postop neurosurgical care per Dr. Bailey  -AED per neurosurgery  -CT of the head pending today  -As needed pain management ordered  -PT/OT    Diabetes  -Continue sliding scale insulin with Accu-Cheks ACHS  -Blood glucose 140-180    Hypertension  -Blood pressure management per primary  -Required Cardene for a short time, has been off since 8 PM yesterday evening    GERD  -Pepcid as needed      Critical Care Set:  Feeding: Speech therapy consult  Analgesia: As needed ordered  Sedation: N/A  Thromboprophylaxis: SCD's  HOB: 30+  Ulcer prophylaxis: Pepcid as needed  Glycemic control: Sliding scale insulin  SBT: N/A  Bowel movement: Regimen ordered  Catheter removal: N/A  Medication de-escalation: N/A    Disposition  Continue critical care management.    Arnol Sandhu DO  Pulmonary Critical Care Medicine  12/4/2024  16:02 CST  "

## 2024-12-04 NOTE — OP NOTE
Procedure Note  Preop Diagnosis: * No pre-op diagnosis entered *    * No Diagnosis Codes entered *     Procedure Name: right frontal craniotomy    Indications:  A CT revealed findings of right frontal subdural hematoma. The patient now presents for right frontal craniotomy after discussing therapeutic alternatives.          Surgeon: Timo Bailey MD     Assistants: None    Anesthesia: General endotracheal anesthesia    ASA Class: 3    Procedure Details   After obtaining informed consent, having the risks and benefits of the procedure explained including but not limited to infection, bleeding, paralysis, spinal fluid leak, speech and memory problems, in addition we specifically discussed the possibility of a second surgery, stroke, coma, and death, the patient was brought to the operating room.  The patient was given general anesthesia via an endotracheal tube.  The patient was laid supine on the operating table.  The head was turned to the right on a foam doughnut.  The right frontal area was then marked with indelible marker for a preplanned incision and a small amount of hair was removed using electric clippers.  The patient was prepped and draped in standard sterile fashion.  The preplanned incision was infiltrated with Marcaine and epinephrine.  A 10 blade scalpel was used to make an incision at the dermis and epidermis.  Bovie cautery was used to extend the incision to the subcutaneous and soft tissues to the level of the pericranium.  Wayne clips were then applied to the skin edges.  A cerebellar retractors were placed into the wound.  A craniotome was then used to drill a bur hole.  Bipolar cautery was used to coagulate the surface of the dura.  The dura was then incised in a cruciate manner using an 11 blade scalpel.  The edges of the dura were then bipolared.  chronic subdural fluid immediately came out under high pressure.  The subdural space was then evacuated using warm normal saline.  A subdural  ventriculostomy type drain was placed into the subdural space and anchored to the skin using a 4-0 Monocryl.  The dura was then reapproximated using a series of inverted interrupted 4-0 Nurolons.  A large piece of DuraGen was placed over the dural incisions.  The bone flap was then replaced and held in place with bur hole covers and mini screws.  The entire wound was then copiously irrigated with antibiotic solution.  It was inspected for hemostasis.  The galea was then reapproximated using a series of interrupted 2-0 Vicryl sutures.  The skin was closed using running 4-0 Monocryl.  All sponge, needle and instrument counts were correct at the end of the procedure.  Patient was extubated in stable condition and returned to recovery room with about 15 mL of blood loss.        Findings:  Subdural Hematoma    Estimated Blood Loss:   15           Drains: Subdural drain           Total IV Fluids: mL           Specimens: None           Implants:   Implant Name Type Inv. Item Serial No.  Lot No. LRB No. Used Action   HEMOST ABS SURGIFOAM  8X12 10MM - [id number] Implant HEMOST ABS SURGIFOAM  8X12 10MM  [employer]  [employer] J [employer] J [id number] Right 1 Implanted   KT HEMOST ABS SURGIFOAM PORCN 1GRAM - [id number] Implant KT HEMOST ABS SURGIFOAM PORCN 1GRAM  [employer]  DIV [employer] J AND J [id number] Right 1 Implanted   HEMOST ABS SURGICEL 4X8IN - [id number] Implant HEMOST ABS SURGICEL 4X8IN  [employer]  DIV OF J AND J [id number] Right 1 Implanted   PLT BURHL LEFORTE PRE/CONTRD SHNT TI 5HL 0.3X22MM GRN - [id number] Implant PLT BURHL LEFORTE PRE/CONTRD SHNT TI 5HL 0.3X22MM GRN  JTS SURGICAL NA Right 1 Implanted   SCRW MAXLOCK EXTRM NL 4X14MM - [id number] Implant SCRW MAXLOCK EXTRM NL 4X14MM  ORTHOHELIX SURGICAL DESIGN NA Right 3 Implanted              Complications:  none           Disposition: PACU - hemodynamically stable.           Condition: stable        Timo Bailey MD

## 2024-12-04 NOTE — PLAN OF CARE
Goal Outcome Evaluation:  Plan of Care Reviewed With: patient           Outcome Evaluation: PT eval complete. She is alert and oriented x 4, very pleasant. Mrs. Kunz was independent in her mobility and ADL's prior to admit. She also cares for her spouse. Today she needs CGA/min assist x 1 to ambulate. Was able to ambulate ~ 200 ft. Demos some sway with gait needing min assist to maintain dynamic balance. Demos equal B LE AROM, strength, sensation and coordination to testing. PT will cont with mobility and balance training. Anticipate d.c home with her spouse and family.    Anticipated Discharge Disposition (PT): home with assist

## 2024-12-04 NOTE — PROGRESS NOTES
Courtesy note:    Patient well-known to me from the office that presented with a fall 6 weeks ago.  Was actually seen in the ER thank I am and workup included CT of the head which was negative for any acute intracranial pathology.  Acutely 2 days ago developed numbness of the left upper extremity.  Contacted our office yesterday morning and we instructed her to return to the ER.  Workup showed subdural hematoma and she was taken to the operating room yesterday evacuation.  States she is doing well.  Briefly discussed with neurosurgery.  Admitted to the intensivist following the ICU.  I will follow peripherally for now but I am available for any needed medical consultation and for transition of care to regular floor bed when she is stable.    Electronically signed by Jermaine Wolff MD, 12/04/24, 7:53 AM CST.    Cheek-To-Nose Interpolation Flap Text: A decision was made to reconstruct the defect utilizing an interpolation axial flap and a staged reconstruction.  A telfa template was made of the defect.  This telfa template was then used to outline the Cheek-To-Nose Interpolation flap.  The donor area for the pedicle flap was then injected with anesthesia.  The flap was excised through the skin and subcutaneous tissue down to the layer of the underlying musculature.  The interpolation flap was carefully excised within this deep plane to maintain its blood supply.  The edges of the donor site were undermined.   The donor site was closed in a primary fashion.  The pedicle was then rotated into position and sutured.  Once the tube was sutured into place, adequate blood supply was confirmed with blanching and refill.  The pedicle was then wrapped with xeroform gauze and dressed appropriately with a telfa and gauze bandage to ensure continued blood supply and protect the attached pedicle.

## 2024-12-04 NOTE — PAYOR COMM NOTE
"REF:  [id number]    [facility]  ARIC,   [phone]  OR  FAX  [phone]       Rosa Kunz (84 y.o. Female)       Date of Birth   1940    Social Security Number       Address   [address] [place], [place] [address]    Home Phone   [phone]    MRN   [id number]       [place]   [facility], [place], operated [facility]    Marital Status                               Admission Date   12/3/24    Admission Type   Emergency    Admitting Provider   Timo Bailey MD    Attending Provider   Daniel Sandhu DO    Department, Room/Bed   [facility] INTENSIVE CARE, I005/       Discharge Date       Discharge Disposition       Discharge Destination                                 Attending Provider: Daniel Sandhu DO    Allergies: Sulfa Antibiotics    Isolation: None   Infection: None   Code Status: CPR    Ht: 154.9 cm (61\")   Wt: 79 kg (174 lb 2.6 oz)    Admission Cmt: None   Principal Problem: SDH (subdural hematoma) [S06.5XAA]                   Active Insurance as of 12/3/2024       Primary Coverage       Payor Plan Insurance Group Employer/Plan Group    HUMANA MEDICARE REPLACEMENT HUMANA MED ADV PPO [id number]       Payor Plan Address Payor Plan Phone Number Payor Plan Fax Number Effective Dates    [address] [address] [phone]  2024 - None Entered    [facility] [id number]         Subscriber Name Subscriber Birth Date Member ID       ROSA KUNZ 1940 [id number]                     Emergency Contacts        (Rel.) Home Phone Work Phone Mobile Phone    William Kunz (Spouse) -- -- [phone]    Maria Elena Garcia (Daughter) -- -- [phone]    Frida Stovall (Sister) -- -- [phone]    Sonia Goff (Grandchild) -- -- [phone]                 History & Physical        Eri Grajeda APRN at 24 [phone]          [facility]   HISTORY AND PHYSICAL    Patient Name: Rosa Kunz  : 1940  MRN: [id number]  Primary Care Physician:  Jermaine Wolff " MD Joel  Date of admission: 12/3/2024    Subjective  Subjective     Chief Complaint: Subdural hemorrhage    History of Present Illness  84-year-old female patient past medical history non-insulin-dependent diabetic, hypertension and GERD taken to OR by Dr. Bailey/neurosurgery for Bur hole evacuation of the right subdural hematoma.  Patient sustained a ground-level fall approximately 6 weeks ago and was seen in the ED for a laceration repair and at that time CT imaging was negative for any subdural hematoma.  She endorses waking up yesterday morning with left hand paresthesias that did not improve therefore she seeks medical attention today back in the ED.  Repeat CT imaging reveals right frontal subdural hematoma with mass effect.  Was taken urgently to the OR by neurosurgery as she was symptomatic with left hand paresthesias.    Patient was brought back to the ICU room 5 after a successful unremarkable right frontal craniotomy with subdural drain in place.    I have seen evaluate the patient immediately upon arriving to ICU room 5.  Patient is hemodynamically stable.  Blood pressure 127/60.  Nicardipine drip has been paused.  Patient is alert oriented cognitively intact.  She endorses intact sensation noted to her fingertips.          Review of Systems   Neurological:  Positive for numbness.   All other systems reviewed and are negative.       Personal History     Past Medical History:   Diagnosis Date    Diabetes mellitus     GERD (gastroesophageal reflux disease)     Hypertension        Past Surgical History:   Procedure Laterality Date    CHOLECYSTECTOMY      HEMORRHOIDECTOMY      CORTEZ'S NEUROMA EXCISION Left        Family History: family history includes COPD in her father; Diabetes in her mother; Heart disease in her father and mother; Hypertension in her father and mother. Otherwise pertinent FHx was reviewed and not pertinent to current issue.    Social History:  reports that she has quit smoking. Her  smoking use included cigarettes. She has never used smokeless tobacco. She reports that she does not drink alcohol and does not use drugs.    Home Medications:  FLUoxetine, enalapril, famotidine, metFORMIN, metoprolol tartrate, and montelukast    Allergies:  Allergies   Allergen Reactions    Sulfa Antibiotics Rash       Objective   Objective     Vitals:   Temp:  [98 °F (36.7 °C)-98.6 °F (37 °C)] 98 °F (36.7 °C)  Heart Rate:  [69-99] 85  Resp:  [14-18] 14  BP: ()/() 127/59  Flow (L/min) (Oxygen Therapy):  [2-8] 2    Physical Exam  Vitals and nursing note reviewed. Exam conducted with a chaperone present.   Constitutional:       General: She is not in acute distress.     Appearance: Normal appearance.   HENT:      Head:      Comments: Scalp dressing in place, no active bleeding visualized.  Subdural drain patent with serosanguineous drainage noted to bag.  Eyes:      Extraocular Movements: Extraocular movements intact.      Pupils: Pupils are equal, round, and reactive to light.   Cardiovascular:      Rate and Rhythm: Normal rate and regular rhythm.      Pulses: Normal pulses.   Pulmonary:      Effort: Pulmonary effort is normal. No respiratory distress.      Breath sounds: Normal breath sounds. No wheezing.   Abdominal:      Palpations: Abdomen is soft.      Tenderness: There is no abdominal tenderness.   Musculoskeletal:         General: Normal range of motion.      Cervical back: Normal range of motion.      Right lower leg: No edema.      Left lower leg: No edema.   Skin:     General: Skin is warm.      Capillary Refill: Capillary refill takes 2 to 3 seconds.   Neurological:      General: No focal deficit present.      Mental Status: She is alert and oriented to person, place, and time. Mental status is at baseline.         Result Review   Result Review:  I have personally reviewed the results from the time of this admission to 12/3/2024 22:59 CST and agree with these findings:  [x]  Laboratory list /  accordion  []  Microbiology  [x]  Radiology  []  EKG/Telemetry   []  Cardiology/Vascular   []  Pathology  []  Old records  []  Other:  Most notable findings include: Right sided extra axial hemorrhage with mass effect.      Assessment & Plan  Assessment / Plan     Brief Patient Summary:  Rosa Kunz is a 84 y.o. female who presents to the ED with left hand paresthesias.  Repeat CT imaging of the brain reveals an acute right-sided subdural hematoma with mass effect.  Patient was taken urgently to OR by neurosurgery for bur hole evacuation and subdural drain placement.    Active Hospital Problems:  Active Hospital Problems    Diagnosis     **SDH (subdural hematoma)      Plan:   Admit to acute inpatient ICU CCU under intensivist services  Neurosurgery managing acute subdural hematoma and evacuation  BP parameters 160 or less, nicardipine drip to maintain parameters-past medical history of hypertension will need to reconcile home medications and resume her home medications in the a.m.  Overnight implement as needed labetalol and hydralazine and nicardipine drip if warranted.  Will monitor in the ICU overnight for any neurological changes or acute bleeding.  Past medical history of insulin-dependent diabetic, implement sliding scale coverage during this admission  6.   Reconcile home medication list    VTE Prophylaxis: [facility]  Mechanical VTE prophylaxis orders are signed & held. Mechanical VTE prophylaxis orders are present.        CODE STATUS:    Code Status (Patient has no pulse and is not breathing): CPR (Attempt to Resuscitate)  Medical Interventions (Patient has pulse or is breathing): Full Support    Admission Status:  I believe this patient meets inpatient status.    Patient was admitted to intensivist services.  Dr. Sandhu intensivist is aware patient's admission after OR.  He will be updated with any overnight events however final treatment and recommendations to be at his discretion    50 minutes of critical  care provided. This time excludes other billable procedures. Time does include preparation of documents, medical consultations, review of old records, and direct bedside care. Patient is at high risk for life-threatening deterioration due to subdural hemorrhage      YVONNE Martinez    Electronically signed by Eri Grajeda APRN at 12/03/24 8021          Operative/Procedure Notes (last 48 hours)        Timo Bailey MD at 12/03/24 1731          Procedure Note  Preop Diagnosis: * No pre-op diagnosis entered *    * No Diagnosis Codes entered *     Procedure Name: right frontal craniotomy    Indications:  A CT revealed findings of right frontal subdural hematoma. The patient now presents for right frontal craniotomy after discussing therapeutic alternatives.          Surgeon: Timo Bailey MD     Assistants: None    Anesthesia: General endotracheal anesthesia    ASA Class: 3    Procedure Details   After obtaining informed consent, having the risks and benefits of the procedure explained including but not limited to infection, bleeding, paralysis, spinal fluid leak, speech and memory problems, in addition we specifically discussed the possibility of a second surgery, stroke, coma, and death, the patient was brought to the operating room.  The patient was given general anesthesia via an endotracheal tube.  The patient was laid supine on the operating table.  The head was turned to the right on a foam doughnut.  The right frontal area was then marked with indelible marker for a preplanned incision and a small amount of hair was removed using electric clippers.  The patient was prepped and draped in standard sterile fashion.  The preplanned incision was infiltrated with Marcaine and epinephrine.  A 10 blade scalpel was used to make an incision at the dermis and epidermis.  Bovie cautery was used to extend the incision to the subcutaneous and soft tissues to the level of the pericranium.  Wayne clips were  then applied to the skin edges.  A cerebellar retractors were placed into the wound.  A craniotome was then used to drill a bur hole.  Bipolar cautery was used to coagulate the surface of the dura.  The dura was then incised in a cruciate manner using an 11 blade scalpel.  The edges of the dura were then bipolared.  chronic subdural fluid immediately came out under high pressure.  The subdural space was then evacuated using warm normal saline.  A subdural ventriculostomy type drain was placed into the subdural space and anchored to the skin using a 4-0 Monocryl.  The dura was then reapproximated using a series of inverted interrupted 4-0 Nurolons.  A large piece of DuraGen was placed over the dural incisions.  The bone flap was then replaced and held in place with bur hole covers and mini screws.  The entire wound was then copiously irrigated with antibiotic solution.  It was inspected for hemostasis.  The galea was then reapproximated using a series of interrupted 2-0 Vicryl sutures.  The skin was closed using running 4-0 Monocryl.  All sponge, needle and instrument counts were correct at the end of the procedure.  Patient was extubated in stable condition and returned to recovery room with about 15 mL of blood loss.        Findings:  Subdural Hematoma    Estimated Blood Loss:   15           Drains: Subdural drain           Total IV Fluids: mL           Specimens: None           Implants:   Implant Name Type Inv. Item Serial No.  Lot No. LRB No. Used Action   HEMOST ABS SURGIFOAM  8X12 10MM - [id number] Implant HEMOST ABS SURGIFOAM  8X12 10MM  [employer]  DIV [employer] J AND J [id number] Right 1 Implanted   KT HEMOST ABS SURGIFOAM PORCN 1GRAM - [id number] Implant KT HEMOST ABS SURGIFOAM PORCN 1GRAM  [employer]  DIV OF J AND J [id number] Right 1 Implanted   HEMOST ABS SURGICEL 4X8IN - [id number] Implant HEMOST ABS SURGICEL 4X8IN  [employer]  DIV OF J AND J [id number] Right 1 Implanted   PLT BURHL LEFORTE PRE/CONTRD SHNT  TI 5HL 0.3X22MM GRN - [id number] Implant PLT BURHL LEFORTE PRE/CONTRD SHNT TI 5HL 0.3X22MM GRN  JTS SURGICAL NA Right 1 Implanted   SCRW MAXLOCK EXTRM NL 4X14MM - [id number] Implant SCRW MAXLOCK EXTRM NL 4X14MM  ORTHOHELIX SURGICAL DESIGN NA Right 3 Implanted              Complications:  none           Disposition: PACU - hemodynamically stable.           Condition: stable        Timo Bailey MD     Electronically signed by Timo Bailye MD at 12/03/24 1834          Consult Notes (last 48 hours)        Terell Mayfield APRN at 12/03/24 1530       Attestation signed by Timo Bailey MD at 12/03/24 1604    I have reviewed this documentation and agree.                  Reason for consult: Subdural hematoma    Chief Complaint   Patient presents with    Numbness     left         Requesting provider: Dr. Marco Antonio Yates    HPI: This is a 84-year-old female patient who presented today to the emergency room with a complaint of numbness in her left hand and weakness.  She also stated that she had some numbness around her lips but this has resolved as well.  The patient does have a history of sustaining a fall back on October 19, 2024 where she lost her balance and hit her head on the concrete.  There was no loss of consciousness at that time.  She did have a laceration which was closed with staples which was also removed on October 29, 2024.  She did have imaging of her head done on October 19, 2024 which did not show any concerns for subdural hematoma.  She comes in today with the numbness complaints and had a repeat CT scan of her head which did show a right frontal subdural hematoma with mass effect but no slight shift.  She says the numbness has been going on since December 2, 2024.  She did have some facial numbness this morning but this is already resolved.  She does feel like he is having weakness in the left hand as well and having difficulty with coordination.  She is right-hand dominant.    Review of  "Systems   Constitutional:  Positive for activity change. Negative for fever.   Musculoskeletal: Negative.    Neurological:  Positive for weakness and numbness.   All other systems reviewed and are negative.       Pertinent positives/negatives documented in HPI.  All other systems reviewed and negative.    Past Medical History:  has a past medical history of Diabetes mellitus, GERD (gastroesophageal reflux disease), and Hypertension.    Past Surgical History:  has a past surgical history that includes Cholecystectomy; Frederick's Neuroma Excision (Left); and Hemorrhoid surgery.    Family History: family history includes COPD in her father; Diabetes in her mother; Heart disease in her father and mother; Hypertension in her father and mother.    Social History:  reports that she has quit smoking. Her smoking use included cigarettes. She has never used smokeless tobacco. She reports that she does not drink alcohol and does not use drugs.    Allergies: Sulfa antibiotics    Medications: Scheduled Meds:   Continuous Infusions:   PRN Meds:.  sodium chloride     Objective:  General Appearance:  Comfortable, well-appearing, in no acute distress and not in pain.    Vital signs: (most recent): Blood pressure 140/66, pulse 69, temperature 98.6 °F (37 °C), temperature source Oral, resp. rate 16, height 154.9 cm (61\"), weight 78.5 kg (173 lb 1 oz), SpO2 100%.  Vital signs are normal.  No fever.    Output: Producing urine.    HEENT: Normal HEENT exam.    Lungs:  Normal effort and normal respiratory rate.  She is not in respiratory distress.    Heart: Normal rate.  Regular rhythm.    Chest: Symmetric chest wall expansion.   Extremities: Normal range of motion.    Skin:  Warm and dry.    Abdomen: Abdomen is soft and non-distended.  Bowel sounds are normal.   There is no abdominal tenderness.     Pulses: Distal pulses are intact.        Neurological Exam  Mental Status  Awake and alert. Oriented to person, place and time. Oriented to " person, place, and time. Speech is normal. Language is fluent with no aphasia. Attention and concentration are normal.    Cranial Nerves  CN I: Sense of smell is normal.  CN II: Right normal visual field. Left normal visual field.  CN III, IV, VI: Extraocular movements intact bilaterally. Normal lids and orbits bilaterally. Pupils equal round and reactive to light bilaterally.  CN V: Facial sensation is normal.  CN VII:  Right: There is no facial weakness.  Left: There is no facial weakness.  CN XI: Shoulder shrug strength is normal.  CN XII: Tongue midline without atrophy or fasciculations.    Motor  Normal muscle bulk throughout. Normal muscle tone. Strength is 5/5 throughout all four extremities.    Sensory  Sensation is intact to light touch, pinprick, vibration and proprioception in all four extremities.    Reflexes  Deep tendon reflexes are 2+ and symmetric in all four extremities.    Coordination  Right: Rapid alternating movement normal.Left: Rapid alternating movement abnormality:      Vital Signs  Temp:  [98 °F (36.7 °C)-98.6 °F (37 °C)] 98.6 °F (37 °C)  Heart Rate:  [69-72] 69  Resp:  [16-18] 16  BP: (140-156)/(66-84) 140/66    Physical Exam  Constitutional:       General: She is awake. Vital signs are normal.      Appearance: Normal appearance. She is well-developed.   HENT:      Head: Normocephalic.   Eyes:      General: Lids are normal.      Extraocular Movements: Extraocular movements intact.      Conjunctiva/sclera: Conjunctivae normal.      Pupils: Pupils are equal, round, and reactive to light.   Cardiovascular:      Rate and Rhythm: Normal rate and regular rhythm.      Pulses: Intact distal pulses.   Pulmonary:      Effort: Pulmonary effort is normal. No respiratory distress.      Breath sounds: Normal breath sounds.   Abdominal:      General: Bowel sounds are normal. There is no distension.      Palpations: Abdomen is soft.   Musculoskeletal:         General: Normal range of motion.      Cervical  back: Normal range of motion.   Skin:     General: Skin is warm and dry.   Neurological:      Mental Status: She is alert and oriented to person, place, and time.      GCS: GCS eye subscore is 4. GCS verbal subscore is 5. GCS motor subscore is 6.      Cranial Nerves: No cranial nerve deficit.      Sensory: No sensory deficit.      Motor: Motor strength is normal.     Coordination: Impaired rapid alternating movements.      Deep Tendon Reflexes: Reflexes are normal and symmetric. Reflexes normal.   Psychiatric:         Speech: Speech normal.         Behavior: Behavior normal.         Thought Content: Thought content normal.         Results Review:   I reviewed the patient's new clinical results.  I reviewed the patient's new imaging results and agree with the interpretation.  I reviewed the patient's other test results and agree with the interpretation          Lab Results (last 24 hours)       Procedure Component Value Units Date/Time    Protime-INR [[id number]] Collected: 12/03/24 1330    Specimen: Blood Updated: 12/03/24 1528    aPTT [[id number]] Collected: 12/03/24 1330    Specimen: Blood Updated: 12/03/24 1528    Magnesium [[id number]]  (Normal) Collected: 12/03/24 1330    Specimen: Blood Updated: 12/03/24 1410     Magnesium 1.6 mg/dL     Single High Sensitivity Troponin T [[id number]]  (Normal) Collected: 12/03/24 1330    Specimen: Blood Updated: 12/03/24 1409     HS Troponin T <6 ng/L     Narrative:      High Sensitive Troponin T Reference Range:  <14.0 ng/L- Negative Female for AMI  <22.0 ng/L- Negative Male for AMI  >=14 - Abnormal Female indicating possible myocardial injury.  >=22 - Abnormal Male indicating possible myocardial injury.   Clinicians would have to utilize clinical acumen, EKG, Troponin, and serial changes to determine if it is an Acute Myocardial Infarction or myocardial injury due to an underlying chronic condition.         Comprehensive Metabolic Panel [[id number]]  (Abnormal) Collected:  12/03/24 1330    Specimen: Blood Updated: 12/03/24 1400     Glucose 136 mg/dL      BUN 9 mg/dL      Creatinine 0.62 mg/dL      Sodium 132 mmol/L      Potassium 4.2 mmol/L      Chloride 91 mmol/L      CO2 29.0 mmol/L      Calcium 8.7 mg/dL      Total Protein 6.8 g/dL      Albumin 3.7 g/dL      ALT (SGPT) 8 U/L      AST (SGOT) 14 U/L      Alkaline Phosphatase 106 U/L      Total Bilirubin 0.7 mg/dL      Globulin 3.1 gm/dL      A/G Ratio 1.2 g/dL      BUN/Creatinine Ratio 14.5     Anion Gap 12.0 mmol/L      eGFR 87.9 mL/min/1.73     Narrative:      GFR Normal >60  Chronic Kidney Disease <60  Kidney Failure <15    The GFR formula is only valid for adults with stable renal function between ages 18 and 70.    [place] Draw [[id number]] Collected: 12/03/24 1330    Specimen: Blood Updated: 12/03/24 1345    Narrative:      The following orders were created for panel order [place] Draw.  Procedure                               Abnormality         Status                     ---------                               -----------         ------                     Green Top (Gel)[[id number]]                                  Final result               Lavender Top[[id number]]                                     Final result               Red Top[[id number]]                                          Final result               Alonso Top[[id number]]                                         Final result               Light Blue Top[[id number]]                                   Final result                 Please view results for these tests on the individual orders.    Green Top (Gel) [[id number]] Collected: 12/03/24 1330    Specimen: Blood Updated: 12/03/24 1345     Extra Tube Hold for add-ons.     Comment: Auto resulted.       Lavender Top [[id number]] Collected: 12/03/24 1330    Specimen: Blood Updated: 12/03/24 1345     Extra Tube hold for add-on     Comment: Auto resulted       Red Top [[id number]] Collected: 12/03/24 1330    Specimen: Blood  Updated: 12/03/24 1345     Extra Tube Hold for add-ons.     Comment: Auto resulted.       Gray Top [[id number]] Collected: 12/03/24 1330    Specimen: Blood Updated: 12/03/24 1345     Extra Tube Hold for add-ons.     Comment: Auto resulted.       Light Blue Top [[id number]] Collected: 12/03/24 1330    Specimen: Blood Updated: 12/03/24 1345     Extra Tube Hold for add-ons.     Comment: Auto resulted       CBC & Differential [[id number]]  (Normal) Collected: 12/03/24 1330    Specimen: Blood Updated: 12/03/24 1340    Narrative:      The following orders were created for panel order CBC & Differential.  Procedure                               Abnormality         Status                     ---------                               -----------         ------                     CBC Auto Differential[[id number]]        Normal              Final result                 Please view results for these tests on the individual orders.    CBC Auto Differential [[id number]]  (Normal) Collected: 12/03/24 1330    Specimen: Blood Updated: 12/03/24 1340     WBC 7.73 10*3/mm3      RBC 4.50 10*6/mm3      Hemoglobin 13.8 g/dL      Hematocrit 43.3 %      MCV 96.2 fL      MCH 30.7 pg      MCHC 31.9 g/dL      RDW 13.4 %      RDW-SD 47.6 fl      MPV 9.6 fL      Platelets 219 10*3/mm3      Neutrophil % 63.3 %      Lymphocyte % 25.7 %      Monocyte % 8.4 %      Eosinophil % 1.8 %      Basophil % 0.5 %      Immature Grans % 0.3 %      Neutrophils, Absolute 4.89 10*3/mm3      Lymphocytes, Absolute 1.99 10*3/mm3      Monocytes, Absolute 0.65 10*3/mm3      Eosinophils, Absolute 0.14 10*3/mm3      Basophils, Absolute 0.04 10*3/mm3      Immature Grans, Absolute 0.02 10*3/mm3      nRBC 0.0 /100 WBC           CT scan of the head that was done on December 3, 2024 shows a right frontal chronic subdural hematoma that is causing mass effect and minimal midline shift of 2 to 3 mm.        Assessment/Plan:   The patient does have a subdural hematoma.  She is can to  be admitted to the intensivist.  We will start her on Keppra.  The patient is symptomatic from the subdural hematoma and will need to go to the operating room tonight for a bur hole on the right to relieve the subdural hematoma.  Dr. Bailey did review the imaging and discussed this with the patient.  The visit was conducted with Dr. Bailey along with the exam.  The patient's questions and concerns were addressed.  Will get the patient to the operating room as quickly as possible.    Hospital diagnosis:  Subdural hematoma  Seizure      I discussed the patient's findings and my recommendations with patient, family, nursing staff, and consulting provider    Terell Mayfield, APRN  12/03/24  15:30 CST    I spent 48 minutes caring for Rosa on this date of service. This time includes time spent by me in the following activities: preparing for the visit, reviewing tests, performing a medically appropriate examination and/or evaluation, counseling and educating the patient/family/caregiver, referring and communicating with other health care professionals, documenting information in the medical record, independently interpreting results and communicating that information with the patient/family/caregiver, ordering medications, and obtaining a separately obtained history           Electronically signed by Timo Bailey MD at 12/03/24 3245

## 2024-12-04 NOTE — H&P
[facility]   HISTORY AND PHYSICAL    Patient Name: Rosa Kunz  : 1940  MRN: [id number]  Primary Care Physician:  Jermaine Wolff MD  Date of admission: 12/3/2024    Subjective   Subjective     Chief Complaint: Subdural hemorrhage    History of Present Illness  84-year-old female patient past medical history non-insulin-dependent diabetic, hypertension and GERD taken to OR by Dr. Bailey/neurosurgery for Bur hole evacuation of the right subdural hematoma.  Patient sustained a ground-level fall approximately 6 weeks ago and was seen in the ED for a laceration repair and at that time CT imaging was negative for any subdural hematoma.  She endorses waking up yesterday morning with left hand paresthesias that did not improve therefore she seeks medical attention today back in the ED.  Repeat CT imaging reveals right frontal subdural hematoma with mass effect.  Was taken urgently to the OR by neurosurgery as she was symptomatic with left hand paresthesias.    Patient was brought back to the ICU room 5 after a successful unremarkable right frontal craniotomy with subdural drain in place.    I have seen evaluate the patient immediately upon arriving to ICU room 5.  Patient is hemodynamically stable.  Blood pressure 127/60.  Nicardipine drip has been paused.  Patient is alert oriented cognitively intact.  She endorses intact sensation noted to her fingertips.          Review of Systems   Neurological:  Positive for numbness.   All other systems reviewed and are negative.       Personal History     Past Medical History:   Diagnosis Date    Diabetes mellitus     GERD (gastroesophageal reflux disease)     Hypertension        Past Surgical History:   Procedure Laterality Date    CHOLECYSTECTOMY      HEMORRHOIDECTOMY      CORTEZ'S NEUROMA EXCISION Left        Family History: family history includes COPD in her father; Diabetes in her mother; Heart disease in her father and mother; Hypertension in her father  and mother. Otherwise pertinent FHx was reviewed and not pertinent to current issue.    Social History:  reports that she has quit smoking. Her smoking use included cigarettes. She has never used smokeless tobacco. She reports that she does not drink alcohol and does not use drugs.    Home Medications:  FLUoxetine, enalapril, famotidine, metFORMIN, metoprolol tartrate, and montelukast    Allergies:  Allergies   Allergen Reactions    Sulfa Antibiotics Rash       Objective    Objective     Vitals:   Temp:  [98 °F (36.7 °C)-98.6 °F (37 °C)] 98 °F (36.7 °C)  Heart Rate:  [69-99] 85  Resp:  [14-18] 14  BP: ()/() 127/59  Flow (L/min) (Oxygen Therapy):  [2-8] 2    Physical Exam  Vitals and nursing note reviewed. Exam conducted with a chaperone present.   Constitutional:       General: She is not in acute distress.     Appearance: Normal appearance.   HENT:      Head:      Comments: Scalp dressing in place, no active bleeding visualized.  Subdural drain patent with serosanguineous drainage noted to bag.  Eyes:      Extraocular Movements: Extraocular movements intact.      Pupils: Pupils are equal, round, and reactive to light.   Cardiovascular:      Rate and Rhythm: Normal rate and regular rhythm.      Pulses: Normal pulses.   Pulmonary:      Effort: Pulmonary effort is normal. No respiratory distress.      Breath sounds: Normal breath sounds. No wheezing.   Abdominal:      Palpations: Abdomen is soft.      Tenderness: There is no abdominal tenderness.   Musculoskeletal:         General: Normal range of motion.      Cervical back: Normal range of motion.      Right lower leg: No edema.      Left lower leg: No edema.   Skin:     General: Skin is warm.      Capillary Refill: Capillary refill takes 2 to 3 seconds.   Neurological:      General: No focal deficit present.      Mental Status: She is alert and oriented to person, place, and time. Mental status is at baseline.         Result Review    Result Review:  I  have personally reviewed the results from the time of this admission to 12/3/2024 22:59 CST and agree with these findings:  [x]  Laboratory list / accordion  []  Microbiology  [x]  Radiology  []  EKG/Telemetry   []  Cardiology/Vascular   []  Pathology  []  Old records  []  Other:  Most notable findings include: Right sided extra axial hemorrhage with mass effect.      Assessment & Plan   Assessment / Plan     Brief Patient Summary:  Roas Kunz is a 84 y.o. female who presents to the ED with left hand paresthesias.  Repeat CT imaging of the brain reveals an acute right-sided subdural hematoma with mass effect.  Patient was taken urgently to OR by neurosurgery for bur hole evacuation and subdural drain placement.    Active Hospital Problems:  Active Hospital Problems    Diagnosis     **SDH (subdural hematoma)      Plan:   Admit to acute inpatient ICU CCU under intensivist services  Neurosurgery managing acute subdural hematoma and evacuation  BP parameters 160 or less, nicardipine drip to maintain parameters-past medical history of hypertension will need to reconcile home medications and resume her home medications in the a.m.  Overnight implement as needed labetalol and hydralazine and nicardipine drip if warranted.  Will monitor in the ICU overnight for any neurological changes or acute bleeding.  Past medical history of insulin-dependent diabetic, implement sliding scale coverage during this admission  6.   Reconcile home medication list    VTE Prophylaxis: [facility]  Mechanical VTE prophylaxis orders are signed & held. Mechanical VTE prophylaxis orders are present.        CODE STATUS:    Code Status (Patient has no pulse and is not breathing): CPR (Attempt to Resuscitate)  Medical Interventions (Patient has pulse or is breathing): Full Support    Admission Status:  I believe this patient meets inpatient status.    Patient was admitted to intensivist services.  Dr. Sandhu intensivist is aware patient's admission  after OR.  He will be updated with any overnight events however final treatment and recommendations to be at his discretion    50 minutes of critical care provided. This time excludes other billable procedures. Time does include preparation of documents, medical consultations, review of old records, and direct bedside care. Patient is at high risk for life-threatening deterioration due to subdural hemorrhage      Eri Grajeda APRN

## 2024-12-04 NOTE — THERAPY EVALUATION
Patient Name: Rosa Kunz  : 1940    MRN: [id number]                              Today's Date: 2024       Admit Date: 12/3/2024    Visit Dx:     ICD-10-CM ICD-9-CM   1. Subdural hemorrhage  I62.00 432.1   2. Impaired mobility [Z74.09]  Z74.09 799.89     Patient Active Problem List   Diagnosis    SDH (subdural hematoma)     Past Medical History:   Diagnosis Date    Diabetes mellitus     GERD (gastroesophageal reflux disease)     Hypertension      Past Surgical History:   Procedure Laterality Date    CARON HOLE Right 12/3/2024    Procedure: ACRON HOLE;  Surgeon: Timo Bailey MD;  Location: [facility] OR;  Service: Neurosurgery;  Laterality: Right;    CHOLECYSTECTOMY      HEMORRHOIDECTOMY      CORTEZ'S NEUROMA EXCISION Left       General Information       Row Name 24          Physical Therapy Time and Intention    Document Type evaluation  Fall ~ 6 weeks ago. Now with L hand numbess. Dx: R SDH w mass effect s/p 12/3 R frontal craniotomy  -ANATOLIY     Mode of Treatment physical therapy  -ANATOLIY       Row Name 24          General Information    Patient Profile Reviewed yes  -ANATOLIY     Prior Level of Function independent:;all household mobility;ADL's  -ANATOLIY     Existing Precautions/Restrictions fall  subdural drain  -ANATOLIY     Barriers to Rehab medically complex  -ANATOLIY       Row Name 24          Living Environment    People in Home spouse  -ANATOLIY       Row Name 24 09          Home Main Entrance    Number of Stairs, Main Entrance none  ramp  -ANATOLIY       Row Name 24          Stairs Within Home, Primary    Number of Stairs, Within Home, Primary none  -ANATOLIY       Row Name 24          Cognition    Orientation Status (Cognition) oriented x 4  -ANATOLIY       Row Name 24          Safety Issues/Impairments Affecting Functional Mobility    Impairments Affecting Function (Mobility) balance;endurance/activity tolerance;strength;pain  -ANATOLIY               User Garcia  (r) = Recorded  By, (t) = Taken By, (c) = Cosigned By      Initials Name Provider Type    Anton Chi, PT DPT Physical Therapist                   Mobility       Row Name 12/04/24 0926          Bed Mobility    Bed Mobility supine-sit;sit-supine  -ANATOLIY     Supine-Sit Cavalier (Bed Mobility) contact guard  -ANATOLIY     Sit-Supine [place] (Bed Mobility) not tested  -ANATOLIY     Assistive Device (Bed Mobility) head of bed elevated;bed rails  -ANATOLIY       Row Name 12/04/24 0926          Sit-Stand Transfer    Sit-Stand [place] (Transfers) contact guard;minimum assist (75% patient effort)  -ANATOLIY       Row Name 12/04/24 0926          Gait/Stairs (Locomotion)    Cavalier Level (Gait) contact guard;minimum assist (75% patient effort)  -ANATOLIY     Distance in Feet (Gait) 200  -ANATOLIY     Deviations/Abnormal Patterns (Gait) gait speed decreased  -ANATOLIY     Comment, (Gait/Stairs) Some sway with gait, CGA/min to maintain dynamic balance.   -ANATOLIY               User Key  (r) = Recorded By, (t) = Taken By, (c) = Cosigned By      Initials Name Provider Type    Anton Chi, PT DPT Physical Therapist                   Obj/Interventions       Row Name 12/04/24 0926          Range of Motion Comprehensive    General Range of Motion bilateral lower extremity ROM WFL  -ANATOLIY       Row Name 12/04/24 0926          Strength Comprehensive (MMT)    Comment, General Manual Muscle Testing (MMT) Assessment B LE grossly 4/5  -ANATOLIY       Row Name 12/04/24 0926          Motor Skills    Motor Skills coordination  -ANATOLIY     Coordination bilateral;lower extremity;heel to [place];WFL  -ANATOLIY       Row Name 12/04/24 0926          Balance    Balance Assessment sitting dynamic balance;standing dynamic balance  -ANATOLIY     Dynamic Sitting Balance standby assist  -ANATOLIY     Position, Sitting Balance unsupported;sitting edge of bed  -ANATOLIY     Dynamic Standing Balance minimal assist  -ANATOLIY     Position/Device Used, Standing Balance supported  -ANATOLIY       Row Name 12/04/24 0926          Sensory  Assessment (Somatosensory)    Sensory Assessment (Somatosensory) LE sensation intact  -ANATOLIY               User Key  (r) = Recorded By, (t) = Taken By, (c) = Cosigned By      Initials Name Provider Type    Anton Chi, PT DPT Physical Therapist                   Goals/Plan       Row Name 12/04/24 0926          Bed Mobility Goal 1 (PT)    Activity/Assistive Device (Bed Mobility Goal 1, PT) sit to supine/supine to sit  -ANATOLIY     [place] Level/Cues Needed (Bed Mobility Goal 1, PT) supervision required  -ANATOLIY     Time Frame (Bed Mobility Goal 1, PT) long term goal (LTG);10 days  -ANATOLIY     Progress/Outcomes (Bed Mobility Goal 1, PT) new goal  -ANATOLIY       Row Name 12/04/24 0926          Transfer Goal 1 (PT)    Activity/Assistive Device (Transfer Goal 1, PT) sit-to-stand/stand-to-sit;bed-to-chair/chair-to-bed  -ANATOLIY     [place] Level/Cues Needed (Transfer Goal 1, PT) supervision required  -ANATOLIY     Time Frame (Transfer Goal 1, PT) long term goal (LTG);10 days  -ANATOLIY     Progress/Outcome (Transfer Goal 1, PT) new goal  -ANATOLIY       Row Name 12/04/24 0926          Gait Training Goal 1 (PT)    Activity/Assistive Device (Gait Training Goal 1, PT) gait (walking locomotion);decrease fall risk;improve balance and speed;increase endurance/gait distance  -ANATOLIY     [place] Level (Gait Training Goal 1, PT) supervision required  -ANATOLIY     Distance (Gait Training Goal 1, PT) 200 ft  -ANATOLIY     Time Frame (Gait Training Goal 1, PT) long term goal (LTG);10 days  -ANATOLIY     Progress/Outcome (Gait Training Goal 1, PT) new goal  -ANATOLIY       Row Name 12/04/24 0926          Therapy Assessment/Plan (PT)    Planned Therapy Interventions (PT) bed mobility training;transfer training;gait training;balance training;home exercise program;patient/family education;postural re-education;stair training;strengthening  -ANATOLIY               User Key  (r) = Recorded By, (t) = Taken By, (c) = Cosigned By      Initials Name Provider Type    Anton Chi, PT DPT  Physical Therapist                   Clinical Impression       Row Name 12/04/24 0926          Pain    Pain Location head  -ANATOLIY     Pain Management Interventions exercise or physical activity utilized  -ANATOLIY     Additional Documentation Pain Scale: FACES Pre/Post-Treatment (Group)  -ANATOLIY       Row Name 12/04/24 0926          Pain Scale: FACES Pre/Post-Treatment    Pain: FACES Scale, Pretreatment 2-->hurts little bit  -ANATOLIY       Row Name 12/04/24 0926          Plan of Care Review    Plan of Care Reviewed With patient  -ANATOLIY     Outcome Evaluation PT eval complete. She is alert and oriented x 4, very pleasant. Mrs. Kunz was independent in her mobility and ADL's prior to admit. She also cares for her spouse. Today she needs CGA/min assist x 1 to ambulate. Was able to ambulate ~ 200 ft. Demos some sway with gait needing min assist to maintain dynamic balance. Demos equal B LE AROM, strength, sensation and coordination to testing. PT will cont with mobility and balance training. Anticipate d.c home with her spouse and family.  -ANATOLIY       Row Name 12/04/24 0926          Therapy Assessment/Plan (PT)    Patient/Family Therapy Goals Statement (PT) go home  -ANATOLIY     Rehab Potential (PT) good  -ANATOLIY     Criteria for Skilled Interventions Met (PT) yes;meets criteria;skilled treatment is necessary  -ANATOLIY     Therapy Frequency (PT) 2 times/day  -ANATOLIY     Predicted Duration of Therapy Intervention (PT) until d.c  -ANATOLIY       Row Name 12/04/24 0926          Positioning and Restraints    Pre-Treatment Position in bed  -ANATOLIY     Post Treatment Position chair  -ANATOLIY     In Chair notified nsg;reclined;call light within reach;encouraged to call for assist;patient within staff view  -ANATOLIY               User Key  (r) = Recorded By, (t) = Taken By, (c) = Cosigned By      Initials Name Provider Type    Anton Chi, PT DPT Physical Therapist                   Outcome Measures       Row Name 12/04/24 0926 12/04/24 0830       How much help from another  person do you currently need...    Turning from your back to your side while in flat bed without using bedrails? 3  -ANATOLIY 3  -JA    Moving from lying on back to sitting on the side of a flat bed without bedrails? 3  -ANATOLIY 3  -JA    Moving to and from a bed to a chair (including a wheelchair)? 3  -ANATOLIY 3  -JA    Standing up from a chair using your arms (e.g., wheelchair, bedside chair)? 3  -ANATOLIY 3  -JA    Climbing 3-5 steps with a railing? 3  -ANATOLIY 3  -JA    To walk in hospital room? 3  -ANATOLIY 3  -JA    AM-PAC 6 Clicks Score (PT) 18  -ANATOLIY 18  -JA    Highest Level of Mobility Goal 6 --> Walk 10 steps or more  -ANATOLIY 6 --> Walk 10 steps or more  -JA      Row Name 12/04/24 0715          How much help from another person do you currently need...    Turning from your back to your side while in flat bed without using bedrails? 2  -SE     Moving from lying on back to sitting on the side of a flat bed without bedrails? 2  -SE     Moving to and from a bed to a chair (including a wheelchair)? 2  -SE     Standing up from a chair using your arms (e.g., wheelchair, bedside chair)? 2  -SE     Climbing 3-5 steps with a railing? 2  -SE     To walk in hospital room? 2  -SE     AM-PAC 6 Clicks Score (PT) 12  -SE     Highest Level of Mobility Goal 4 --> Transfer to chair/commode  -SE       Row Name 12/04/24 0926          Functional Assessment    Outcome Measure Options AM-PAC 6 Clicks Basic Mobility (PT)  -ANATOLIY               User Key  (r) = Recorded By, (t) = Taken By, (c) = Cosigned By      Initials Name Provider Type    Anton Chi, PT DPT Physical Therapist    Frankie Lynn, RN Registered Nurse    Ayan Robledo, RN Registered Nurse                                 Physical Therapy Education       Title: PT OT SLP Therapies (In Progress)       Topic: Physical Therapy (In Progress)       Point: Mobility training (Done)       Learning Progress Summary            Patient Acceptance, SANDI GARCIA,DU by ANATOLIY at 12/4/2024 0926    Comment: benefits of  activity, progression of PT                      Point: Home exercise program (Not Started)       Learner Progress:  Not documented in this visit.              Point: Body mechanics (Not Started)       Learner Progress:  Not documented in this visit.              Point: Precautions (Not Started)       Learner Progress:  Not documented in this visit.                              User Key       Initials Effective Dates Name Provider Type Thuy COPE 02/03/23 -  Anton Haile, PT DPT Physical Therapist PT                  PT Recommendation and Plan  Planned Therapy Interventions (PT): bed mobility training, transfer training, gait training, balance training, home exercise program, patient/family education, postural re-education, stair training, strengthening  Outcome Evaluation: PT eval complete. She is alert and oriented x 4, very pleasant. Mrs. Kunz was independent in her mobility and ADL's prior to admit. She also cares for her spouse. Today she needs CGA/min assist x 1 to ambulate. Was able to ambulate ~ 200 ft. Demos some sway with gait needing min assist to maintain dynamic balance. Demos equal B LE AROM, strength, sensation and coordination to testing. PT will cont with mobility and balance training. Anticipate d.c home with her spouse and family.     Time Calculation:         PT Charges       Row Name 12/04/24 1011             Time Calculation    Start Time 0926  -ANATOLIY      Stop Time 1001  + 5 minutes w chart review. PT with 40 total minutes  -ANATOLIY      Time Calculation (min) 35 min  -ANATOLIY      PT Received On 12/04/24  -ANATOLIY      PT Goal Re-Cert Due Date 12/14/24  -ANATOLIY         Untimed Charges    PT Eval/Re-eval Minutes 40  -ANATOLIY         Total Minutes    Untimed Charges Total Minutes 40  -ANATOLIY       Total Minutes 40  -ANATOLIY                User Key  (r) = Recorded By, (t) = Taken By, (c) = Cosigned By      Initials Name Provider Type    Anton Chi, PT DPT Physical Therapist                  Therapy Charges  for Today       Code Description Service Date Service Provider Modifiers Qty    [id number] HC PT EVAL MOD COMPLEXITY 3 12/4/2024 Anton Haile, PT DPT GP 1            PT G-Codes  Outcome Measure Options: AM-PAC 6 Clicks Basic Mobility (PT)  AM-PAC 6 Clicks Score (PT): 18  PT Discharge Summary  Anticipated Discharge Disposition (PT): home with assist    Anton Haile, PT DPT  12/4/2024

## 2024-12-04 NOTE — ANESTHESIA POSTPROCEDURE EVALUATION
"Patient: Rosa Kunz    Procedure Summary       Date: 12/03/24 Room / Location:  [facility] OR  /  [facility] OR    Anesthesia Start: 1654 Anesthesia Stop: 1845    Procedure: CARON HOLE (Right: Head) Diagnosis:     Surgeons: Timo Bailey MD Provider: Sajan Castillo CRNA    Anesthesia Type: general ASA Status: 3 - Emergent            Anesthesia Type: general    Vitals  Vitals Value Taken Time   /68 12/03/24 1907   Temp 98 °F (36.7 °C) 12/03/24 1834   Pulse 99 12/03/24 1909   Resp 14 12/03/24 1905   SpO2 97 % 12/03/24 1908   Vitals shown include unfiled device data.        Post Anesthesia Care and Evaluation    Patient location during evaluation: PACU  Patient participation: complete - patient participated  Level of consciousness: awake and awake and alert  Pain score: 0  Pain management: adequate    Airway patency: patent  Anesthetic complications: No anesthetic complications  PONV Status: none  Cardiovascular status: acceptable  Respiratory status: acceptable  Hydration status: acceptable    Comments: Patient discharged according to acceptable Virgilio score per RN assessment. See nursing records for further information.     Blood pressure 129/58, pulse 60, temperature 98 °F (36.7 °C), temperature source Oral, resp. rate 14, height 154.9 cm (61\"), weight 79 kg (174 lb 2.6 oz), SpO2 92%.      "

## 2024-12-04 NOTE — PLAN OF CARE
Problem: Adult Inpatient Plan of Care  Goal: Plan of Care Review  Outcome: Progressing  Flowsheets  Taken 12/4/2024 0540 by Eileen Garcia RN  Progress: improving  Outcome Evaluation: Pt A&Ox4. VSS. Afebrile. Adequate UOP. Cardene gtt off all night. Neuro intact.  moderate, weaker on left side. Pupils 3, brisk, equal. CT Head obtained per MD order. Drain flushed per MD order. Turned q2h. Safety maintained.  Taken 12/3/2024 1900 by Molly Joshi RN  Plan of Care Reviewed With: patient  Goal: Patient-Specific Goal (Individualized)  Outcome: Progressing  Goal: Absence of Hospital-Acquired Illness or Injury  Outcome: Progressing  Intervention: Identify and Manage Fall Risk  Recent Flowsheet Documentation  Taken 12/4/2024 0500 by Eileen Garcia RN  Safety Promotion/Fall Prevention:   safety round/check completed   fall prevention program maintained  Taken 12/4/2024 0400 by Eileen Garcia RN  Safety Promotion/Fall Prevention:   safety round/check completed   fall prevention program maintained  Taken 12/4/2024 0300 by Eileen Garcia RN  Safety Promotion/Fall Prevention:   safety round/check completed   fall prevention program maintained  Taken 12/4/2024 0200 by Eileen Garcia RN  Safety Promotion/Fall Prevention:   safety round/check completed   fall prevention program maintained  Taken 12/4/2024 0100 by Eileen Garcia RN  Safety Promotion/Fall Prevention:   safety round/check completed   fall prevention program maintained  Taken 12/4/2024 0000 by Eileen Garcia RN  Safety Promotion/Fall Prevention:   safety round/check completed   fall prevention program maintained  Taken 12/3/2024 2300 by Eileen Garcia RN  Safety Promotion/Fall Prevention:   safety round/check completed   fall prevention program maintained  Taken 12/3/2024 2200 by Eileen Garcia RN  Safety Promotion/Fall Prevention:   safety round/check completed   fall prevention program maintained  Taken 12/3/2024 2100 by Eileen Garcia RN  Safety  Promotion/Fall Prevention:   safety round/check completed   fall prevention program maintained  Taken 12/3/2024 2000 by Eileen Garcia RN  Safety Promotion/Fall Prevention:   safety round/check completed   fall prevention program maintained  Taken 12/3/2024 1915 by Eileen Garcia RN  Safety Promotion/Fall Prevention:   safety round/check completed   fall prevention program maintained  Intervention: Prevent Skin Injury  Recent Flowsheet Documentation  Taken 12/4/2024 0500 by Eileen Garcia RN  Body Position:   turned   supine  Taken 12/4/2024 0300 by Eileen Garcia RN  Body Position:   turned   right  Taken 12/4/2024 0100 by Eileen Garcia RN  Body Position:   turned   left  Taken 12/3/2024 2300 by Eileen Garcia RN  Body Position:   turned   right  Taken 12/3/2024 2100 by Eileen Garcia RN  Body Position:   turned   left  Taken 12/3/2024 1915 by Eileen Garcia RN  Body Position: supine  Intervention: Prevent and Manage VTE (Venous Thromboembolism) Risk  Recent Flowsheet Documentation  Taken 12/4/2024 0400 by Eileen Garcia RN  VTE Prevention/Management:   bilateral   SCDs (sequential compression devices) on  Intervention: Prevent Infection  Recent Flowsheet Documentation  Taken 12/4/2024 0500 by Eileen Garcia RN  Infection Prevention: rest/sleep promoted  Taken 12/4/2024 0400 by Eileen Garcia RN  Infection Prevention: rest/sleep promoted  Taken 12/4/2024 0300 by Eileen Garcia RN  Infection Prevention: rest/sleep promoted  Taken 12/4/2024 0200 by Eileen Garcia RN  Infection Prevention: rest/sleep promoted  Taken 12/4/2024 0100 by Eileen Garcia RN  Infection Prevention: rest/sleep promoted  Taken 12/4/2024 0000 by Eileen Garcia RN  Infection Prevention: rest/sleep promoted  Taken 12/3/2024 2300 by Eileen Garcia RN  Infection Prevention: rest/sleep promoted  Taken 12/3/2024 2200 by Eileen Garcia RN  Infection Prevention: rest/sleep promoted  Taken 12/3/2024 2100 by Eileen Garcia RN  Infection  Prevention: rest/sleep promoted  Taken 12/3/2024 2000 by Eileen Garcia RN  Infection Prevention: rest/sleep promoted  Taken 12/3/2024 1915 by Eileen Garcia RN  Infection Prevention: rest/sleep promoted  Goal: Optimal Comfort and Wellbeing  Outcome: Progressing  Intervention: Provide Person-Centered Care  Recent Flowsheet Documentation  Taken 12/4/2024 0400 by Eileen Garcia RN  Trust Relationship/Rapport:   care explained   emotional support provided  Taken 12/4/2024 0000 by Eileen Garcia RN  Trust Relationship/Rapport:   care explained   emotional support provided  Taken 12/3/2024 1915 by Eileen Garcia RN  Trust Relationship/Rapport:   care explained   emotional support provided  Goal: Readiness for Transition of Care  Outcome: Progressing  Intervention: Mutually Develop Transition Plan  Recent Flowsheet Documentation  Taken 12/3/2024 1900 by Eileen Garcia RN  Equipment Currently Used at Home: cane, straight  Transportation Anticipated: family or friend will provide  Transportation Concerns: none  Patient/Family Anticipated Services at Transition: none  Patient/Family Anticipates Transition to: home     Problem: Skin Injury Risk Increased  Goal: Skin Health and Integrity  Outcome: Progressing  Intervention: Optimize Skin Protection  Recent Flowsheet Documentation  Taken 12/4/2024 0500 by Eileen Garcia RN  Head of Bed (HOB) Positioning: HOB at 30 degrees  Taken 12/4/2024 0400 by Eileen Garcia RN  Activity Management: bedrest  Taken 12/4/2024 0300 by Eileen Garcia RN  Head of Bed (HOB) Positioning: HOB at 30 degrees  Taken 12/4/2024 0200 by Eileen Garcia RN  Activity Management: bedrest  Taken 12/4/2024 0100 by Eileen Garcia RN  Head of Bed (HOB) Positioning: HOB at 30 degrees  Taken 12/4/2024 0000 by Eileen Garcia RN  Activity Management: bedrest  Taken 12/3/2024 2300 by Eileen Garcia RN  Head of Bed (HOB) Positioning: HOB at 30 degrees  Taken 12/3/2024 2200 by Eileen Garcia RN  Activity  Management: bedrest  Taken 12/3/2024 2100 by Eileen Garcia RN  Head of Bed (HOB) Positioning: HOB at 30 degrees  Taken 12/3/2024 2000 by Eileen Garcia RN  Activity Management: bedrest  Taken 12/3/2024 1915 by Eileen Garcia RN  Activity Management: bedrest  Head of Bed (HOB) Positioning: HOB at 30 degrees     Problem: Fall Injury Risk  Goal: Absence of Fall and Fall-Related Injury  Outcome: Progressing  Intervention: Identify and Manage Contributors  Recent Flowsheet Documentation  Taken 12/4/2024 0500 by Eileen Garcia RN  Medication Review/Management: medications reviewed  Taken 12/4/2024 0400 by Eileen Garcia RN  Medication Review/Management: medications reviewed  Taken 12/4/2024 0300 by Eileen Garcia RN  Medication Review/Management: medications reviewed  Taken 12/4/2024 0200 by Eileen Garcia RN  Medication Review/Management: medications reviewed  Taken 12/4/2024 0100 by Eileen Garcia RN  Medication Review/Management: medications reviewed  Taken 12/4/2024 0000 by Eileen Garcia RN  Medication Review/Management: medications reviewed  Taken 12/3/2024 2300 by Eileen Garcia RN  Medication Review/Management: medications reviewed  Taken 12/3/2024 2200 by Eileen Garcia RN  Medication Review/Management: medications reviewed  Taken 12/3/2024 2100 by Eileen Garcia RN  Medication Review/Management: medications reviewed  Taken 12/3/2024 2000 by Eileen Garcia RN  Medication Review/Management: medications reviewed  Taken 12/3/2024 1915 by Eileen Garcia RN  Medication Review/Management: medications reviewed  Intervention: Promote Injury-Free Environment  Recent Flowsheet Documentation  Taken 12/4/2024 0500 by Eileen Garcia RN  Safety Promotion/Fall Prevention:   safety round/check completed   fall prevention program maintained  Taken 12/4/2024 0400 by Eileen Garcia RN  Safety Promotion/Fall Prevention:   safety round/check completed   fall prevention program maintained  Taken 12/4/2024 0300 by Jose  TAMI Arellano  Safety Promotion/Fall Prevention:   safety round/check completed   fall prevention program maintained  Taken 12/4/2024 0200 by Eileen Garcia RN  Safety Promotion/Fall Prevention:   safety round/check completed   fall prevention program maintained  Taken 12/4/2024 0100 by Eileen Garcia RN  Safety Promotion/Fall Prevention:   safety round/check completed   fall prevention program maintained  Taken 12/4/2024 0000 by Eileen Garcia RN  Safety Promotion/Fall Prevention:   safety round/check completed   fall prevention program maintained  Taken 12/3/2024 2300 by Eileen Garcia RN  Safety Promotion/Fall Prevention:   safety round/check completed   fall prevention program maintained  Taken 12/3/2024 2200 by Eileen Garcia RN  Safety Promotion/Fall Prevention:   safety round/check completed   fall prevention program maintained  Taken 12/3/2024 2100 by Eileen Garcia RN  Safety Promotion/Fall Prevention:   safety round/check completed   fall prevention program maintained  Taken 12/3/2024 2000 by Eileen Garcia RN  Safety Promotion/Fall Prevention:   safety round/check completed   fall prevention program maintained  Taken 12/3/2024 1915 by Eileen Garcia RN  Safety Promotion/Fall Prevention:   safety round/check completed   fall prevention program maintained   Goal Outcome Evaluation:           Progress: improving  Outcome Evaluation: Pt A&Ox4. VSS. Afebrile. Adequate UOP. Cardene gtt off all night. Neuro intact.  moderate, weaker on left side. Pupils 3, brisk, equal. CT Head obtained per MD order. Drain flushed per MD order. Turned q2h. Safety maintained.

## 2024-12-04 NOTE — THERAPY EVALUATION
Patient Name: Rosa Kunz  : 1940    MRN: [id number]                              Today's Date: 2024       Admit Date: 12/3/2024    Visit Dx:     ICD-10-CM ICD-9-CM   1. Subdural hemorrhage  I62.00 432.1   2. Impaired mobility [Z74.09]  Z74.09 799.89     Patient Active Problem List   Diagnosis    SDH (subdural hematoma)     Past Medical History:   Diagnosis Date    Diabetes mellitus     GERD (gastroesophageal reflux disease)     Hypertension      Past Surgical History:   Procedure Laterality Date    CARON HOLE Right 12/3/2024    Procedure: CARON HOLE;  Surgeon: Timo Bailey MD;  Location: [facility] OR;  Service: Neurosurgery;  Laterality: Right;    CHOLECYSTECTOMY      HEMORRHOIDECTOMY      CORETZ'S NEUROMA EXCISION Left       General Information       Row Name 24 0940          OT Time and Intention    Document Type evaluation  ED with L hand weakness, n/t and impaired coordination. Imaging showed a frontalSDH s/p fall approx 8 weeks ago. Pt underwent a R frontal craniotomy on 12/3.  -JW     Mode of Treatment occupational therapy  -JW     Patient Effort good  -JW       Row Name 24 09          General Information    Patient Profile Reviewed yes  -JW     Prior Level of Function independent:;all household mobility;transfer;bed mobility;ADL's  -JW     Existing Precautions/Restrictions fall   subdural drain  -JW     Barriers to Rehab medically complex  -JW       Row Name 24 09          Living Environment    People in Home spouse  -JW       Row Name 24 0940          Home Main Entrance    Number of Stairs, Main Entrance other (see comments)  ramp  -JW       Row Name 24 09          Cognition    Orientation Status (Cognition) oriented x 4  -JW       Row Name 24 0940          Safety Issues/Impairments Affecting Functional Mobility    Impairments Affecting Function (Mobility) balance;pain;coordination;strength  -JW               User Key  (r) = Recorded By, (t) = Taken  By, (c) = Cosigned By      Initials Name Provider Type     Indira Lezama OTR/L, RODRIGO Occupational Therapist                     Mobility/ADL's       Row Name 12/04/24 0940          Bed Mobility    Bed Mobility supine-sit;sit-supine  -     Supine-Sit [place] (Bed Mobility) contact guard  -     Sit-Supine [place] (Bed Mobility) contact guard  -     Assistive Device (Bed Mobility) head of bed elevated;bed rails  -       Row Name 12/04/24 0940          Transfers    Transfers sit-stand transfer;stand-sit transfer  -       Row Name 12/04/24 0940          Sit-Stand Transfer    Sit-Stand [place] (Transfers) contact guard;minimum assist (75% patient effort)  -       Row Name 12/04/24 0940          Stand-Sit Transfer    Stand-Sit [place] (Transfers) contact guard;minimum assist (75% patient effort)  -       Row Name 12/04/24 0940          Functional Mobility    Functional Mobility- Ind. Level minimum assist (75% patient effort);contact guard assist  -     Functional Mobility- Comment amb around unit. LOB noted x2.  -       Row Name 12/04/24 0940          Activities of Daily Living    BADL Assessment/Intervention lower body dressing  -       Row Name 12/04/24 0940          Lower Body Dressing Assessment/Training    [place] Level (Lower Body Dressing) don;socks;minimum assist (75% patient effort)  -     Position (Lower Body Dressing) edge of bed sitting  -     Comment, (Lower Body Dressing) due to coordination and sensation deficits in L hand.  -               User Key  (r) = Recorded By, (t) = Taken By, (c) = Cosigned By      Initials Name Provider Type     Indira Lezama OTR/L, CSRS Occupational Therapist                   Obj/Interventions       Row Name 12/04/24 0940          Sensory Interventions    Comment, Sensory Intervention able to detect all light touch but reports n/t in her entire L hand.  -       Row Name 12/04/24 0940          Vision  Assessment/Intervention    Visual Impairment/Limitations WFL  -       Row Name 12/04/24 0940          Range of Motion Comprehensive    General Range of Motion bilateral upper extremity ROM [facility]  -       Row Name 12/04/24 0940          Strength Comprehensive (MMT)    Comment, General Manual Muscle Testing (MMT) Assessment B UE strength 4/5.  -       Row Name 12/04/24 0940          Balance    Balance Assessment sitting static balance;sitting dynamic balance;standing static balance;standing dynamic balance  -     Static Sitting Balance standby assist  -     Dynamic Sitting Balance contact guard  -     Position, Sitting Balance unsupported;sitting edge of bed  -     Static Standing Balance contact guard  -     Dynamic Standing Balance minimal assist  -     Position/Device Used, Standing Balance supported  -               User Key  (r) = Recorded By, (t) = Taken By, (c) = Cosigned By      Initials Name Provider Type    Indira Whiteside, OTR/L, CSRS Occupational Therapist                   Goals/Plan       Row Name 12/04/24 0940          Dressing Goal 1 (OT)    Activity/Device (Dressing Goal 1, OT) dressing skills, all  -     [place]/Cues Needed (Dressing Goal 1, OT) modified independence  -     Time Frame (Dressing Goal 1, OT) long term goal (LTG);by discharge  -     Progress/Outcome (Dressing Goal 1, OT) new goal  -       Row Name 12/04/24 0940          Toileting Goal 1 (OT)    Activity/Device (Toileting Goal 1, OT) toileting skills, all  -     [place] Level/Cues Needed (Toileting Goal 1, OT) modified independence  -     Time Frame (Toileting Goal 1, OT) long term goal (LTG);by discharge  -     Progress/Outcome (Toileting Goal 1, OT) new goal  -       Row Name 12/04/24 0940          Problem Specific Goal 1 (OT)    Problem Specific Goal 1 (OT) Pt will independently complete [facility] HEP to improve independence with adls.  -     Time Frame (Problem Specific Goal 1, OT)  long term goal (LTG);by discharge  -     Progress/Outcome (Problem Specific Goal 1, OT) new goal  -       Row Name 12/04/24 0989          Therapy Assessment/Plan (OT)    Planned Therapy Interventions (OT) activity tolerance training;adaptive equipment training;functional balance retraining;transfer/mobility retraining;strengthening exercise;occupation/activity based interventions;neuromuscular control/coordination retraining;patient/caregiver education/training  -               User Key  (r) = Recorded By, (t) = Taken By, (c) = Cosigned By      Initials Name Provider Type    Indira Whiteside, OTR/L, CSRS Occupational Therapist                   Clinical Impression       Row Name 12/04/24 0940          Pain Assessment    Pain Location head  -     Pain Management Interventions exercise or physical activity utilized  -     Response to Pain Interventions no change per patient report  -     Additional Documentation Pain Scale: FACES Pre/Post-Treatment (Group)  -       Row Name 12/04/24 0940          Pain Scale: FACES Pre/Post-Treatment    Pain: FACES Scale, Pretreatment 2-->hurts little bit  -     Posttreatment Pain Rating 4-->hurts little more  -       Row Name 12/04/24 0940          Plan of Care Review    Plan of Care Reviewed With patient  -     Outcome Evaluation OT eval completed. Pt presents alert and oriented x4, sitting up in bed with drain in place. 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 Today she was able to bring self to sitting at EOB with CGA. Donned socks with set-up and Min A, long sitting up in bed. She required increased assistance with socks due to coordination and sensation deficits in L hand. She was able to perform sit <> stand t/f from EOB with CGA and amb around unit with Min A. She did have two LOB requiring assistance to correct. Pt left sitting up in chair at end of session. She  would benefit from skilled OT services to address these deficits. Recommend d/c home with assist and HH.  -       Row Name 12/04/24 0940          Therapy Assessment/Plan (OT)    Rehab Potential (OT) good  -JW     Criteria for Skilled Therapeutic Interventions Met (OT) yes;skilled treatment is necessary  -     Therapy Frequency (OT) 5 times/wk  -     Predicted Duration of Therapy Intervention (OT) 10 dayys  -       Row Name 12/04/24 0940          Therapy Plan Review/Discharge Plan (OT)    Anticipated Discharge Disposition (OT) home with assist;home with home health  -       Row Name 12/04/24 0940          Positioning and Restraints    Pre-Treatment Position in bed  -JW     Post Treatment Position chair  -JW     In Chair notified nsg;reclined;call light within reach;encouraged to call for assist;with family/caregiver;patient within staff view  -               User Key  (r) = Recorded By, (t) = Taken By, (c) = Cosigned By      Initials Name Provider Type    JW Indira Lezama, OTR/L, CSRS Occupational Therapist                   Outcome Measures       Row Name 12/04/24 0940          How much help from another is currently needed...    Putting on and taking off regular lower body clothing? 3  -JW     Bathing (including washing, rinsing, and drying) 3  -JW     Toileting (which includes using toilet bed pan or urinal) 3  -JW     Putting on and taking off regular upper body clothing 4  -JW     Taking care of personal grooming (such as brushing teeth) 4  -JW     Eating meals 4  -JW     AM-PAC 6 Clicks Score (OT) 21  -       Row Name 12/04/24 0926 12/04/24 0830       How much help from another person do you currently need...    Turning from your back to your side while in flat bed without using bedrails? 3  -ANATOLIY 3  -JA    Moving from lying on back to sitting on the side of a flat bed without bedrails? 3  -ANATOLIY 3  -JA    Moving to and from a bed to a chair (including a wheelchair)? 3  -ANATOLIY 3  -JA    Standing up  from a chair using your arms (e.g., wheelchair, bedside chair)? 3  -ANATOLIY 3  -JA    Climbing 3-5 steps with a railing? 3  -ANATOLIY 3  -JA    To walk in hospital room? 3  -ANATOLIY 3  -JA    AM-PAC 6 Clicks Score (PT) 18  -ANATOLIY 18  -JA    Highest Level of Mobility Goal 6 --> Walk 10 steps or more  -ANATOLIY 6 --> Walk 10 steps or more  -JA      Row Name 12/04/24 0715          How much help from another person do you currently need...    Turning from your back to your side while in flat bed without using bedrails? 2  -SE     Moving from lying on back to sitting on the side of a flat bed without bedrails? 2  -SE     Moving to and from a bed to a chair (including a wheelchair)? 2  -SE     Standing up from a chair using your arms (e.g., wheelchair, bedside chair)? 2  -SE     Climbing 3-5 steps with a railing? 2  -SE     To walk in hospital room? 2  -SE     AM-PAC 6 Clicks Score (PT) 12  -SE     Highest Level of Mobility Goal 4 --> Transfer to chair/commode  -SE       Row Name 12/04/24 0940 12/04/24 0926       Functional Assessment    Outcome Measure Options AM-PAC 6 Clicks Daily Activity (OT)  - AM-PAC 6 Clicks Basic Mobility (PT)  -ANATOLIY              User Key  (r) = Recorded By, (t) = Taken By, (c) = Cosigned By      Initials Name Provider Type    Anton Chi, PT DPT Physical Therapist    Frankie Lynn, RN Registered Nurse    Indira Whiteside OTR/L, CSRS Occupational Therapist    Ayan Robledo, RN Registered Nurse                    Occupational Therapy Education       Title: PT OT SLP Therapies (In Progress)       Topic: Occupational Therapy (In Progress)       Point: ADL training (Done)       Description:   Instruct learner(s) on proper safety adaptation and remediation techniques during self care or transfers.   Instruct in proper use of assistive devices.                  Learning Progress Summary            Patient Acceptance, E, VU by JIGNESH at 12/4/2024 1013                      Point: Home exercise program (Not  Started)       Description:   Instruct learner(s) on appropriate technique for monitoring, assisting and/or progressing therapeutic exercises/activities.                  Learner Progress:  Not documented in this visit.              Point: Precautions (Done)       Description:   Instruct learner(s) on prescribed precautions during self-care and functional transfers.                  Learning Progress Summary            Patient Acceptance, E, VU by JIGNESH at 12/4/2024 1013                      Point: Body mechanics (Not Started)       Description:   Instruct learner(s) on proper positioning and spine alignment during self-care, functional mobility activities and/or exercises.                  Learner Progress:  Not documented in this visit.                              User Key       Initials Effective Dates Name Provider Type Discipline    JIGNESH 11/15/24 -  Indira Lezama, OTR/L, CSRS Occupational Therapist OT                  OT Recommendation and Plan  Planned Therapy Interventions (OT): activity tolerance training, adaptive equipment training, functional balance retraining, transfer/mobility retraining, strengthening exercise, occupation/activity based interventions, neuromuscular control/coordination retraining, patient/caregiver education/training  Therapy Frequency (OT): 5 times/wk  Plan of Care Review  Plan of Care Reviewed With: patient  Outcome Evaluation: OT eval completed. Pt presents alert and oriented x4, sitting up in bed with drain in place. 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 Today she was able to bring self to sitting at EOB with CGA. Donned socks with set-up and Min A, long sitting up in bed. She required increased assistance with socks due to coordination and sensation deficits in L hand. She was able to perform sit <> stand t/f from EOB with CGA and amb around unit with Min A. She did have two LOB  requiring assistance to correct. Pt left sitting up in chair at end of session. She would benefit from skilled OT services to address these deficits. Recommend d/c home with assist and HH.     Time Calculation:         Time Calculation- OT       Row Name 12/04/24 0940             Time Calculation- OT    OT Start Time 0940  add 6 minutes for CR  -JW      OT Stop Time 1014  -      OT Time Calculation (min) 34 min  -      OT Received On 12/04/24  -      OT Goal Re-Cert Due Date 12/14/24  -                User Key  (r) = Recorded By, (t) = Taken By, (c) = Cosigned By      Initials Name Provider Type    Indira Whiteside OTR/L, CSRS Occupational Therapist                  Therapy Charges for Today       Code Description Service Date Service Provider Modifiers Qty    95291605175 HC OT EVAL MOD COMPLEXITY 3 12/4/2024 Indira Lezama OTR/L, CSRS GO 1                 KEVIN Donohue/L, CSRS  12/4/2024

## 2024-12-04 NOTE — PROGRESS NOTES
"Progress Note    Patient:  Rosa Kunz  YOB: 1940  MRN: [id number]   Admit date: 12/3/2024   Admitting Physician: Daniel Sandhu*  Primary Care Physician: Jermaine Wolff MD    Chief Complaint: Subdural hematoma    Interval History: No events overnight.  Patient has noticed improvement in the numbness and tingling in her left hand.  No neurochanges overnight.  No nausea and vomiting.  Subdural drain in place    Intake/Output Summary (Last 24 hours) at 12/4/2024 0755  Last data filed at 12/4/2024 0402  Gross per 24 hour   Intake 862.08 ml   Output 450 ml   Net 412.08 ml     Allergies:   Allergies   Allergen Reactions    Sulfa Antibiotics Rash     Current Scheduled Medications:   ceFAZolin, 2,000 mg, Intravenous, Q8H  Chlorhexidine Gluconate Cloth, 1 Application, Topical, Once  Chlorhexidine Gluconate Cloth, 1 Application, Topical, Q24H  insulin lispro, 2-9 Units, Subcutaneous, 4x Daily AC & at Bedtime  levETIRAcetam, 1,000 mg, Intravenous, Q12H  mupirocin, 1 Application, Each Nare, BID  senna-docusate sodium, 2 tablet, Oral, BID  sodium chloride, 10 mL, Intravenous, Q12H      Current PRN Medications:    senna-docusate sodium **AND** polyethylene glycol **AND** bisacodyl **AND** bisacodyl    dextrose    dextrose    glucagon (human recombinant)    labetalol    ondansetron    [Transfer Hold] sodium chloride    sodium chloride    sodium chloride    Review of Systems   Constitutional:  Negative for fever.   Neurological:  Positive for numbness.       Pertinent positives/negatives documented in HPI.  All other systems reviewed and negative.    Vital Signs:  /63   Pulse 62   Temp 98 °F (36.7 °C) (Oral)   Resp 14   Ht 154.9 cm (61\")   Wt 79 kg (174 lb 2.6 oz)   SpO2 97%   BMI 32.91 kg/m²     Physical Exam  Constitutional:       General: She is awake. Vital signs are normal.      Appearance: She is well-developed.   HENT:      Head: Normocephalic.   Eyes:      Extraocular " "Movements: Extraocular movements intact.      Pupils: Pupils are equal, round, and reactive to light.   Cardiovascular:      Rate and Rhythm: Normal rate and regular rhythm.      Pulses: Intact distal pulses.   Pulmonary:      Effort: Pulmonary effort is normal. No respiratory distress.   Abdominal:      General: Bowel sounds are normal. There is no distension.      Palpations: Abdomen is soft.   Musculoskeletal:         General: Normal range of motion.      Cervical back: Normal range of motion.   Skin:     General: Skin is warm and dry.   Neurological:      Mental Status: She is alert and oriented to person, place, and time.      GCS: GCS eye subscore is 4. GCS verbal subscore is 5. GCS motor subscore is 6.      Cranial Nerves: No cranial nerve deficit.      Sensory: No sensory deficit.      Motor: Motor strength is normal.     Gait: Gait normal.      Deep Tendon Reflexes: Reflexes are normal and symmetric.   Psychiatric:         Speech: Speech normal.         Behavior: Behavior normal.         Thought Content: Thought content normal.       Vital signs reviewed.  Line/IV site: No erythema, warmth, induration, or tenderness.    Objective:  General Appearance:  Comfortable, well-appearing, in no acute distress and not in pain.    Vital signs: (most recent): Blood pressure 145/63, pulse 62, temperature 98 °F (36.7 °C), temperature source Oral, resp. rate 14, height 154.9 cm (61\"), weight 79 kg (174 lb 2.6 oz), SpO2 97%.  Vital signs are normal.  No fever.    Output: Producing urine.    HEENT: Normal HEENT exam.    Lungs:  Normal effort and normal respiratory rate.  She is not in respiratory distress.    Heart: Normal rate.  Regular rhythm.    Chest: Symmetric chest wall expansion.   Extremities: Normal range of motion.    Skin:  Warm and dry.    Abdomen: Abdomen is soft and non-distended.  Bowel sounds are normal.   There is no abdominal tenderness.     Pulses: Distal pulses are intact.        Neurological " "Exam  Mental Status  Awake and alert. Oriented to person, place and time. Oriented to person, place, and time. Speech is normal. Language is fluent with no aphasia. Attention and concentration are normal.    Cranial Nerves  CN I: Sense of smell is normal.  CN II: Right normal visual field. Left normal visual field.  CN III, IV, VI: Extraocular movements intact bilaterally. Pupils equal round and reactive to light bilaterally.  CN V: Facial sensation is normal.  CN VII:  Right: There is no facial weakness.  Left: There is no facial weakness.  CN XI: Shoulder shrug strength is normal.  CN XII: Tongue midline without atrophy or fasciculations.    Motor  Normal muscle bulk throughout. Normal muscle tone. Strength is 5/5 throughout all four extremities.    Sensory  Sensation is intact to light touch, pinprick, vibration and proprioception in all four extremities.    Reflexes  Deep tendon reflexes are 2+ and symmetric in all four extremities.    Gait   Normal gait.      Lab Results:  ABG:     CBC:   Results from last 7 days   Lab Units 12/04/24  0346 12/03/24  1330   WBC 10*3/mm3 4.83 7.73   HEMOGLOBIN g/dL 11.7* 13.8   HEMATOCRIT % 35.4 43.3   PLATELETS 10*3/mm3 161 219     BMP:  Results from last 7 days   Lab Units 12/04/24  0346 12/03/24  1330   SODIUM mmol/L 134* 132*   POTASSIUM mmol/L 4.0 4.2   CHLORIDE mmol/L 98 91*   CO2 mmol/L 26.0 29.0   BUN mg/dL 10 9   CREATININE mg/dL 0.49* 0.62   GLUCOSE mg/dL 174* 136*   CALCIUM mg/dL 8.0* 8.7   ALT (SGPT) U/L  --  8     Culture Results: No results found for: \"BLOODCX\", \"URINECX\", \"WOUNDCX\", \"MRSACX\", \"RESPCX\", \"STOOLCX\"        Radiology:     CT scan of the head shows improvement in the subdural hematoma with less mass effect and improvement in the midline shift.    Impression/Recommendations:   CV: Heart rate and blood pressure stable  RESP: Oxygen level stable  GI: Tolerating p.o.  : Adequate urine output  NEURO: Neuroexam stable.  Noticing improvement in the numbness " in her left hand  ID: None  Patient to work with physical and Occupational Therapy.  Continue to monitor subdural drain and flush distally.  Patient to remain in ICU today      SDH (subdural hematoma)      Terell Mayfield, APRN

## 2024-12-05 LAB
ANION GAP SERPL CALCULATED.3IONS-SCNC: 8 MMOL/L (ref 5–15)
BUN SERPL-MCNC: 13 MG/DL (ref 8–23)
BUN/CREAT SERPL: 25 (ref 7–25)
CALCIUM SPEC-SCNC: 7.7 MG/DL (ref 8.6–10.5)
CHLORIDE SERPL-SCNC: 104 MMOL/L (ref 98–107)
CO2 SERPL-SCNC: 26 MMOL/L (ref 22–29)
CREAT SERPL-MCNC: 0.52 MG/DL (ref 0.57–1)
DEPRECATED RDW RBC AUTO: 48.3 FL (ref 37–54)
EGFRCR SERPLBLD CKD-EPI 2021: 91.7 ML/MIN/1.73
ERYTHROCYTE [DISTWIDTH] IN BLOOD BY AUTOMATED COUNT: 13.8 % (ref 12.3–15.4)
GLUCOSE BLDC GLUCOMTR-MCNC: 104 MG/DL (ref 70–130)
GLUCOSE BLDC GLUCOMTR-MCNC: 112 MG/DL (ref 70–130)
GLUCOSE BLDC GLUCOMTR-MCNC: 35 MG/DL (ref 70–130)
GLUCOSE BLDC GLUCOMTR-MCNC: 96 MG/DL (ref 70–130)
GLUCOSE SERPL-MCNC: 144 MG/DL (ref 65–99)
HCT VFR BLD AUTO: 32.4 % (ref 34–46.6)
HGB BLD-MCNC: 10.7 G/DL (ref 12–15.9)
MCH RBC QN AUTO: 31.7 PG (ref 26.6–33)
MCHC RBC AUTO-ENTMCNC: 33 G/DL (ref 31.5–35.7)
MCV RBC AUTO: 95.9 FL (ref 79–97)
PLATELET # BLD AUTO: 154 10*3/MM3 (ref 140–450)
PMV BLD AUTO: 9.5 FL (ref 6–12)
POTASSIUM SERPL-SCNC: 3.9 MMOL/L (ref 3.5–5.2)
RBC # BLD AUTO: 3.38 10*6/MM3 (ref 3.77–5.28)
SODIUM SERPL-SCNC: 138 MMOL/L (ref 136–145)
WBC NRBC COR # BLD AUTO: 5.31 10*3/MM3 (ref 3.4–10.8)

## 2024-12-05 PROCEDURE — 97110 THERAPEUTIC EXERCISES: CPT

## 2024-12-05 PROCEDURE — 99024 POSTOP FOLLOW-UP VISIT: CPT | Performed by: NURSE PRACTITIONER

## 2024-12-05 PROCEDURE — 82948 REAGENT STRIP/BLOOD GLUCOSE: CPT

## 2024-12-05 PROCEDURE — 25810000003 SODIUM CHLORIDE 0.9 % SOLUTION: Performed by: NURSE PRACTITIONER

## 2024-12-05 PROCEDURE — 85027 COMPLETE CBC AUTOMATED: CPT | Performed by: NURSE PRACTITIONER

## 2024-12-05 PROCEDURE — 97116 GAIT TRAINING THERAPY: CPT

## 2024-12-05 PROCEDURE — 80048 BASIC METABOLIC PNL TOTAL CA: CPT | Performed by: NURSE PRACTITIONER

## 2024-12-05 PROCEDURE — 97535 SELF CARE MNGMENT TRAINING: CPT | Performed by: OCCUPATIONAL THERAPIST

## 2024-12-05 RX ORDER — SODIUM CHLORIDE 9 MG/ML
100 INJECTION, SOLUTION INTRAVENOUS CONTINUOUS
Status: DISPENSED | OUTPATIENT
Start: 2024-12-05 | End: 2024-12-06

## 2024-12-05 RX ORDER — NOREPINEPHRINE BITARTRATE 0.03 MG/ML
.02-.3 INJECTION, SOLUTION INTRAVENOUS
Status: DISCONTINUED | OUTPATIENT
Start: 2024-12-05 | End: 2024-12-05

## 2024-12-05 RX ORDER — LEVETIRACETAM 500 MG/1
500 TABLET ORAL 2 TIMES DAILY
Status: DISCONTINUED | OUTPATIENT
Start: 2024-12-05 | End: 2024-12-06 | Stop reason: HOSPADM

## 2024-12-05 RX ADMIN — DOCUSATE SODIUM 50 MG AND SENNOSIDES 8.6 MG 2 TABLET: 8.6; 5 TABLET, FILM COATED ORAL at 08:14

## 2024-12-05 RX ADMIN — CHLORHEXIDINE GLUCONATE 1 APPLICATION: 500 CLOTH TOPICAL at 04:00

## 2024-12-05 RX ADMIN — Medication 1 APPLICATION: at 21:07

## 2024-12-05 RX ADMIN — LEVETIRACETAM 500 MG: 500 TABLET, FILM COATED ORAL at 21:02

## 2024-12-05 RX ADMIN — LEVETIRACETAM 500 MG: 500 TABLET, FILM COATED ORAL at 08:14

## 2024-12-05 RX ADMIN — SODIUM CHLORIDE 250 ML: 9 INJECTION, SOLUTION INTRAVENOUS at 00:15

## 2024-12-05 RX ADMIN — METOPROLOL TARTRATE 25 MG: 25 TABLET, FILM COATED ORAL at 08:14

## 2024-12-05 RX ADMIN — ENALAPRIL MALEATE 20 MG: 10 TABLET ORAL at 08:14

## 2024-12-05 RX ADMIN — Medication 10 ML: at 21:07

## 2024-12-05 RX ADMIN — Medication 1 APPLICATION: at 08:15

## 2024-12-05 RX ADMIN — FLUOXETINE HYDROCHLORIDE 20 MG: 20 CAPSULE ORAL at 08:33

## 2024-12-05 RX ADMIN — SODIUM CHLORIDE 100 ML/HR: 9 INJECTION, SOLUTION INTRAVENOUS at 16:21

## 2024-12-05 RX ADMIN — DOCUSATE SODIUM 50 MG AND SENNOSIDES 8.6 MG 2 TABLET: 8.6; 5 TABLET, FILM COATED ORAL at 21:02

## 2024-12-05 RX ADMIN — NOREPINEPHRINE BITARTRATE 0.02 MCG/KG/MIN: 0.03 INJECTION, SOLUTION INTRAVENOUS at 01:10

## 2024-12-05 RX ADMIN — METOPROLOL TARTRATE 25 MG: 25 TABLET, FILM COATED ORAL at 21:03

## 2024-12-05 RX ADMIN — Medication 10 ML: at 08:15

## 2024-12-05 RX ADMIN — SODIUM CHLORIDE 100 ML/HR: 9 INJECTION, SOLUTION INTRAVENOUS at 05:40

## 2024-12-05 RX ADMIN — MONTELUKAST SODIUM 10 MG: 10 TABLET, COATED ORAL at 21:02

## 2024-12-05 NOTE — PROGRESS NOTES
"Progress Note    Patient:  Rosa Kunz  YOB: 1940  MRN: [id number]   Admit date: 12/3/2024   Admitting Physician: Daniel Sandhu*  Primary Care Physician: Jermaine Wolff MD    Chief Complaint:subdural hematoma status post bur hole    Interval History: patient doing well, no events overnight, hypotension noted. Exam stable, patient feels numbness has improved, coordination improved, 15 mL out of subdural drain in last 12 hours    Intake/Output Summary (Last 24 hours) at 12/5/2024 0725  Last data filed at 12/5/2024 0400  Gross per 24 hour   Intake --   Output 1230 ml   Net -1230 ml     Allergies:   Allergies   Allergen Reactions    Sulfa Antibiotics Rash     Current Scheduled Medications:   Chlorhexidine Gluconate Cloth, 1 Application, Topical, Once  Chlorhexidine Gluconate Cloth, 1 Application, Topical, Q24H  enalapril, 20 mg, Oral, Daily  FLUoxetine, 20 mg, Oral, Daily  insulin lispro, 2-9 Units, Subcutaneous, 4x Daily AC & at Bedtime  levETIRAcetam, 500 mg, Oral, BID  metoprolol tartrate, 25 mg, Oral, Q12H  montelukast, 10 mg, Oral, Nightly  mupirocin, 1 Application, Each Nare, BID  senna-docusate sodium, 2 tablet, Oral, BID  sodium chloride, 10 mL, Intravenous, Q12H      Current PRN Medications:    senna-docusate sodium **AND** polyethylene glycol **AND** bisacodyl **AND** bisacodyl    dextrose    dextrose    famotidine    glucagon (human recombinant)    labetalol    ondansetron    [Transfer Hold] sodium chloride    sodium chloride    sodium chloride    Review of Systems   Constitutional:  Negative for fever.   Musculoskeletal: Negative.    Neurological:  Negative for numbness and headaches.       Pertinent positives/negatives documented in HPI.  All other systems reviewed and negative.    Vital Signs:  /55   Pulse 55   Temp 97.7 °F (36.5 °C) (Oral)   Resp 15   Ht 154.9 cm (61\")   Wt 83 kg (182 lb 15.7 oz)   SpO2 94%   BMI 34.57 kg/m²     Physical " "Exam  Constitutional:       General: She is awake. Vital signs are normal.      Appearance: She is well-developed.   HENT:      Head: Normocephalic.   Eyes:      Extraocular Movements: Extraocular movements intact.      Pupils: Pupils are equal, round, and reactive to light.   Cardiovascular:      Rate and Rhythm: Normal rate and regular rhythm.      Pulses: Intact distal pulses.   Pulmonary:      Effort: Pulmonary effort is normal. No respiratory distress.   Abdominal:      General: Bowel sounds are normal. There is no distension.      Palpations: Abdomen is soft.   Musculoskeletal:         General: Normal range of motion.      Cervical back: Normal range of motion.   Skin:     General: Skin is warm and dry.   Neurological:      Mental Status: She is alert and oriented to person, place, and time.      GCS: GCS eye subscore is 4. GCS verbal subscore is 5. GCS motor subscore is 6.      Cranial Nerves: No cranial nerve deficit.      Sensory: No sensory deficit.      Motor: Motor strength is normal.     Gait: Gait normal.      Deep Tendon Reflexes: Reflexes are normal and symmetric.   Psychiatric:         Speech: Speech normal.         Behavior: Behavior normal.         Thought Content: Thought content normal.       Vital signs reviewed.  Line/IV site: No erythema, warmth, induration, or tenderness.    Objective:  General Appearance:  Comfortable, well-appearing, in no acute distress and not in pain.    Vital signs: (most recent): Blood pressure 116/56, pulse 54, temperature 97.7 °F (36.5 °C), temperature source Oral, resp. rate 15, height 154.9 cm (61\"), weight 83 kg (182 lb 15.7 oz), SpO2 93%.  Vital signs are normal.  No fever.    Output: Producing urine.    HEENT: Normal HEENT exam.    Lungs:  Normal effort and normal respiratory rate.  She is not in respiratory distress.    Heart: Normal rate.  Regular rhythm.    Chest: Symmetric chest wall expansion.   Extremities: Normal range of motion.    Skin:  Warm and dry.  " "  Abdomen: Abdomen is soft and non-distended.  Bowel sounds are normal.   There is no abdominal tenderness.     Pulses: Distal pulses are intact.        Neurological Exam  Mental Status  Awake and alert. Oriented to person, place and time. Oriented to person, place, and time. Speech is normal. Language is fluent with no aphasia. Attention and concentration are normal.    Cranial Nerves  CN I: Sense of smell is normal.  CN II: Right normal visual field. Left normal visual field.  CN III, IV, VI: Extraocular movements intact bilaterally. Pupils equal round and reactive to light bilaterally.  CN V: Facial sensation is normal.  CN VII:  Right: There is no facial weakness.  Left: There is no facial weakness.  CN XI: Shoulder shrug strength is normal.  CN XII: Tongue midline without atrophy or fasciculations.    Motor  Normal muscle bulk throughout. Normal muscle tone. Strength is 5/5 throughout all four extremities.    Sensory  Sensation is intact to light touch, pinprick, vibration and proprioception in all four extremities.    Reflexes  Deep tendon reflexes are 2+ and symmetric in all four extremities.    Gait   Normal gait.      Lab Results:  ABG:     CBC:   Results from last 7 days   Lab Units 12/05/24  0231 12/04/24  0346 12/03/24  1330   WBC 10*3/mm3 5.31 4.83 7.73   HEMOGLOBIN g/dL 10.7* 11.7* 13.8   HEMATOCRIT % 32.4* 35.4 43.3   PLATELETS 10*3/mm3 154 161 219     BMP:  Results from last 7 days   Lab Units 12/05/24  0231 12/04/24  0346 12/03/24  1330   SODIUM mmol/L 138 134* 132*   POTASSIUM mmol/L 3.9 4.0 4.2   CHLORIDE mmol/L 104 98 91*   CO2 mmol/L 26.0 26.0 29.0   BUN mg/dL 13 10 9   CREATININE mg/dL 0.52* 0.49* 0.62   GLUCOSE mg/dL 144* 174* 136*   CALCIUM mg/dL 7.7* 8.0* 8.7   ALT (SGPT) U/L  --   --  8     Culture Results: No results found for: \"BLOODCX\", \"URINECX\", \"WOUNDCX\", \"MRSACX\", \"RESPCX\", \"STOOLCX\"          Impression/Recommendations:   CV:HR and BP stable  RESP:oxygen level stable  GI:tolerating " PO  :adequate UOP  NEURO:exam stable and improving  ID:none  Patient making improvements. Will change keppra to PO. Will remove subdural drain today and patient ok to transfer to floor after drain removed. Discussed patient with Dr Jermaine Denis and he will assume care once transferred to the floor. Continue with PT/OT      SDH (subdural hematoma)      Terell Mayfield, APRN

## 2024-12-05 NOTE — CASE MANAGEMENT/SOCIAL WORK
Discharge Planning Assessment  [facility]     Patient Name: Rosa Kunz  MRN: [id number]  Today's Date: 12/5/2024    Admit Date: 12/3/2024        Discharge Needs Assessment       Row Name 12/05/24 1418       Living Environment    People in Home spouse    Name(s) of People in Home William-spouse    Current Living Arrangements home    Primary Care Provided by self    Provides Primary Care For no one    Family Caregiver if Needed spouse    Quality of Family Relationships helpful;involved;supportive    Able to Return to Prior Arrangements yes       Transition Planning    Patient/Family Anticipates Transition to home with family    Patient/Family Anticipated Services at Transition none    Transportation Anticipated family or friend will provide       Discharge Needs Assessment    Readmission Within the Last 30 Days no previous admission in last 30 days    Equipment Currently Used at Home cane, straight    Concerns to be Addressed no discharge needs identified;denies needs/concerns at this time    Discharge Coordination/Progress Spoke to patient to complete DC planning. Pt plans to return home with spouse. Has a PCP and Rx coverage. Denies the need for HH or any DME at this time. Will follow for DC needs.                   Discharge Plan    No documentation.                 Continued Care and Services - Admitted Since 12/3/2024    No active coordination exists for this encounter.          Demographic Summary    No documentation.                  Functional Status    No documentation.                  Psychosocial    No documentation.                  Abuse/Neglect    No documentation.                  Legal    No documentation.                  Substance Abuse    No documentation.                  Patient Forms    No documentation.                     Junie Medel RN

## 2024-12-05 NOTE — PLAN OF CARE
Goal Outcome Evaluation:      - no change in neuro status overnight / alert and oriented x 4 / BP soft last night - 750 mL total bolus given plus started on NS at 100 / 500 UOP / drain output - approx: 15 mL / afebrile      Problem: Adult Inpatient Plan of Care  Goal: Plan of Care Review  Outcome: Progressing  Goal: Patient-Specific Goal (Individualized)  Outcome: Progressing  Goal: Absence of Hospital-Acquired Illness or Injury  Outcome: Progressing  Intervention: Identify and Manage Fall Risk  Recent Flowsheet Documentation  Taken 12/5/2024 0600 by Adrian Bender RN  Safety Promotion/Fall Prevention:   safety round/check completed   room organization consistent   clutter free environment maintained  Taken 12/5/2024 0500 by Adrian Bender RN  Safety Promotion/Fall Prevention:   safety round/check completed   room organization consistent   clutter free environment maintained  Taken 12/5/2024 0400 by Adrian Bender RN  Safety Promotion/Fall Prevention:   safety round/check completed   fall prevention program maintained  Taken 12/5/2024 0300 by Adrian Bender RN  Safety Promotion/Fall Prevention:   safety round/check completed   room organization consistent   clutter free environment maintained  Taken 12/5/2024 0200 by Adrian Bender RN  Safety Promotion/Fall Prevention:   safety round/check completed   room organization consistent   clutter free environment maintained  Taken 12/5/2024 0100 by Adrian Bender RN  Safety Promotion/Fall Prevention:   safety round/check completed   room organization consistent   clutter free environment maintained  Taken 12/5/2024 0000 by Adrian Bender RN  Safety Promotion/Fall Prevention:   safety round/check completed   fall prevention program maintained  Intervention: Prevent Skin Injury  Recent Flowsheet Documentation  Taken 12/5/2024 0500 by Adrian Bender RN  Body Position:   position changed independently   weight shifting   supine  Taken 12/5/2024 0300 by Adrian Bender RN  Body  Position:   position changed independently   weight shifting  Taken 12/5/2024 0100 by Adrian Bender RN  Body Position:   position changed independently   weight shifting   side-lying   left  Taken 12/4/2024 2300 by Adrian Bender RN  Body Position:   position changed independently   weight shifting   supine  Intervention: Prevent and Manage VTE (Venous Thromboembolism) Risk  Recent Flowsheet Documentation  Taken 12/5/2024 0400 by Adrian Bender RN  VTE Prevention/Management:   bilateral   SCDs (sequential compression devices) on  Taken 12/5/2024 0000 by Adrian Bender RN  VTE Prevention/Management:   bilateral   SCDs (sequential compression devices) on  Intervention: Prevent Infection  Recent Flowsheet Documentation  Taken 12/5/2024 0400 by Adrian Bender RN  Infection Prevention: rest/sleep promoted  Taken 12/5/2024 0000 by Adrian Bender RN  Infection Prevention: rest/sleep promoted  Goal: Optimal Comfort and Wellbeing  Outcome: Progressing  Intervention: Provide Person-Centered Care  Recent Flowsheet Documentation  Taken 12/5/2024 0400 by Adrian Bender RN  Trust Relationship/Rapport:   care explained   emotional support provided  Taken 12/5/2024 0000 by Adrian Bender RN  Trust Relationship/Rapport:   care explained   emotional support provided  Goal: Readiness for Transition of Care  Outcome: Progressing     Problem: Skin Injury Risk Increased  Goal: Skin Health and Integrity  Outcome: Progressing  Intervention: Optimize Skin Protection  Recent Flowsheet Documentation  Taken 12/5/2024 0500 by Adrian Bender RN  Head of Bed (HOB) Positioning: HOB at 30 degrees  Taken 12/5/2024 0400 by Adrian Bender RN  Activity Management: activity encouraged  Taken 12/5/2024 0300 by Adrian Bender RN  Head of Bed (HOB) Positioning: HOB at 30 degrees  Taken 12/5/2024 0100 by Adrian Bender RN  Head of Bed (HOB) Positioning: HOB at 30 degrees  Taken 12/5/2024 0000 by Adrian Bender RN  Activity Management: activity  encouraged  Taken 12/4/2024 2300 by Adrian Bender RN  Head of Bed (HOB) Positioning: HOB at 30 degrees     Problem: Fall Injury Risk  Goal: Absence of Fall and Fall-Related Injury  Outcome: Progressing  Intervention: Identify and Manage Contributors  Recent Flowsheet Documentation  Taken 12/5/2024 0600 by Adrian Bender RN  Medication Review/Management: medications reviewed  Taken 12/5/2024 0500 by Adrian Bender RN  Medication Review/Management: medications reviewed  Taken 12/5/2024 0400 by Adrian Bender RN  Medication Review/Management: medications reviewed  Taken 12/5/2024 0300 by Adrian Bender RN  Medication Review/Management: medications reviewed  Taken 12/5/2024 0200 by Adrian Bender RN  Medication Review/Management: medications reviewed  Taken 12/5/2024 0100 by Adrian Bender RN  Medication Review/Management: medications reviewed  Taken 12/5/2024 0000 by Adrian Bender RN  Medication Review/Management: medications reviewed  Intervention: Promote Injury-Free Environment  Recent Flowsheet Documentation  Taken 12/5/2024 0600 by Adrian Bender RN  Safety Promotion/Fall Prevention:   safety round/check completed   room organization consistent   clutter free environment maintained  Taken 12/5/2024 0500 by Adrian Bender RN  Safety Promotion/Fall Prevention:   safety round/check completed   room organization consistent   clutter free environment maintained  Taken 12/5/2024 0400 by Adrian Bender RN  Safety Promotion/Fall Prevention:   safety round/check completed   fall prevention program maintained  Taken 12/5/2024 0300 by Adrian Bender RN  Safety Promotion/Fall Prevention:   safety round/check completed   room organization consistent   clutter free environment maintained  Taken 12/5/2024 0200 by Adrian Bender RN  Safety Promotion/Fall Prevention:   safety round/check completed   room organization consistent   clutter free environment maintained  Taken 12/5/2024 0100 by Adrian Bender RN  Safety  Promotion/Fall Prevention:   safety round/check completed   room organization consistent   clutter free environment maintained  Taken 12/5/2024 0000 by Adrian Bender RN  Safety Promotion/Fall Prevention:   safety round/check completed   fall prevention program maintained

## 2024-12-05 NOTE — THERAPY TREATMENT NOTE
Patient Name: Rosa Kunz  : 1940    MRN: [id number]                              Today's Date: 2024       Admit Date: 12/3/2024    Visit Dx:     ICD-10-CM ICD-9-CM   1. Subdural hemorrhage  I62.00 432.1   2. Impaired mobility [Z74.09]  Z74.09 799.89     Patient Active Problem List   Diagnosis    SDH (subdural hematoma)     Past Medical History:   Diagnosis Date    Diabetes mellitus     GERD (gastroesophageal reflux disease)     Hypertension      Past Surgical History:   Procedure Laterality Date    CARON HOLE Right 12/3/2024    Procedure: CARON HOLE;  Surgeon: Timo Bailey MD;  Location: [facility] OR;  Service: Neurosurgery;  Laterality: Right;    CHOLECYSTECTOMY      HEMORRHOIDECTOMY      CORTEZ'S NEUROMA EXCISION Left       General Information       Row Name 24 1400          OT Time and Intention    Document Type therapy note (daily note)  -     Mode of Treatment occupational therapy  -     Patient Effort good  -       Row Name 24 1400          General Information    Patient Profile Reviewed yes  -     Existing Precautions/Restrictions fall  -     Barriers to Rehab medically complex  -       Row Name 24 1400          Cognition    Orientation Status (Cognition) oriented x 4  -       Row Name 24 1400          Safety Issues/Impairments Affecting Functional Mobility    Safety Issues Affecting Function (Mobility) impulsivity  -     Impairments Affecting Function (Mobility) balance;pain;coordination;strength  -               User Key  (r) = Recorded By, (t) = Taken By, (c) = Cosigned By      Initials Name Provider Type     Indira Lezama, OTR/L, CSRS Occupational Therapist                     Mobility/ADL's       Row Name 24 1400          Bed Mobility    Bed Mobility sit-supine  -     Sit-Supine [place] (Bed Mobility) standby assist  -     Assistive Device (Bed Mobility) head of bed elevated  -       Row Name 24 1400           Transfers    Transfers sit-stand transfer;stand-sit transfer  -       Row Name 12/05/24 1400          Sit-Stand Transfer    Sit-Stand [place] (Transfers) contact guard  -       Row Name 12/05/24 1400          Stand-Sit Transfer    Stand-Sit [place] (Transfers) contact guard  -[facility] Name 12/05/24 1400          Functional Mobility    Functional Mobility- Ind. Level contact guard assist  -     Functional Mobility- Comment often attempted to use wall of objects around her stablize self when ambulating.  -       Row Name 12/05/24 1400          Activities of Daily Living    BADL Assessment/Intervention lower body dressing;upper body dressing  -[facility] Name 12/05/24 1400          Lower Body Dressing Assessment/Training    [place] Level (Lower Body Dressing) don;socks;contact guard assist  -     Position (Lower Body Dressing) edge of bed sitting  -[facility] Name 12/05/24 1400          Upper Body Dressing Assessment/Training    [place] Level (Upper Body Dressing) don;pajama/robe;set up;standby assist  -     Position (Upper Body Dressing) unsupported standing  -               User Key  (r) = Recorded By, (t) = Taken By, (c) = Cosigned By      Initials Name Provider Type     Indira Lezama, OTHOLLY/L, CSRS Occupational Therapist                   Obj/Interventions       Row Name 12/05/24 1400          Vision Assessment/Intervention    Vision Assessment Comment worked on visual scanning to detect safety hazards during functional mobility to increase pt awaress and safety during ambulation.  -       Row Name 12/05/24 1400          Balance    Balance Assessment sitting static balance;sitting dynamic balance;standing dynamic balance;standing static balance  -     Static Sitting Balance standby assist  -     Dynamic Sitting Balance contact guard  -     Position, Sitting Balance unsupported  -     Static Standing Balance contact guard  -     Dynamic Standing Balance  minimal assist;contact guard  -     Position/Device Used, Standing Balance supported  -               User Key  (r) = Recorded By, (t) = Taken By, (c) = Cosigned By      Initials Name Provider Type    Indira Whiteside OTR/L, RODRIGO Occupational Therapist                   Goals/Plan    No documentation.                  Clinical Impression       Row Name 12/05/24 1400          Pain Assessment    Pretreatment Pain Rating 0/10 - no pain  -     Posttreatment Pain Rating 0/10 - no pain  -     Pain Location head  -       Row Name 12/05/24 1400          Plan of Care Review    Plan of Care Reviewed With patient  -     Outcome Evaluation OT tx completed. Pt presents alert and oriented x4, sitting up in chair upon therapist entry to room. She is impulsive throughout session and demonstrated decreased safety awareness. Instead of putting the leg of the chair down, pt attempted to get off around the side of the chair. Educated on safety awareness for task. She worked on visual scanning to detect safety hazards during functional mobility to increase pt awareness and safety during ambulation. She was able to accurately located 3/4 safety situations in hallways. She was returned back to [facility] in bed at end of session with Sup only. Continue OT POC.  -       Row Name 12/05/24 1400          Therapy Plan Review/Discharge Plan (OT)    Anticipated Discharge Disposition (OT) home with assist;home with home health  -[facility] Name 12/05/24 1400          Positioning and Restraints    Pre-Treatment Position sitting in chair/recliner  -     Post Treatment Position bed  -     In Bed notified nsg;call light within reach;encouraged to call for assist;side rails up x3;patient within staff view;segunwjessi  -               User Key  (r) = Recorded By, (t) = Taken By, (c) = Cosigned By      Initials Name Provider Type    Indira Whiteside OTR/L, RODRIGO Occupational Therapist                   Outcome Measures        Row Name 12/05/24 1400          How much help from another is currently needed...    Putting on and taking off regular lower body clothing? 3  -JW     Bathing (including washing, rinsing, and drying) 3  -JW     Toileting (which includes using toilet bed pan or urinal) 3  -JW     Putting on and taking off regular upper body clothing 4  -JW     Taking care of personal grooming (such as brushing teeth) 4  -JW     Eating meals 4  -JW     AM-PAC 6 Clicks Score (OT) 21  -JW       Row Name 12/05/24 0946 12/05/24 0729       How much help from another person do you currently need...    Turning from your back to your side while in flat bed without using bedrails? 4  -MF 3  -AB    Moving from lying on back to sitting on the side of a flat bed without bedrails? 3  -MF 3  -AB    Moving to and from a bed to a chair (including a wheelchair)? 3  -MF 3  -AB    Standing up from a chair using your arms (e.g., wheelchair, bedside chair)? 3  -MF 3  -AB    Climbing 3-5 steps with a railing? 3  -MF 3  -AB    To walk in hospital room? 3  -MF 3  -AB    AM-PAC 6 Clicks Score (PT) 19  -MF 18  -AB    Highest Level of Mobility Goal 6 --> Walk 10 steps or more  -MF 6 --> Walk 10 steps or more  -AB      Row Name 12/05/24 0400          How much help from another person do you currently need...    Turning from your back to your side while in flat bed without using bedrails? 3  -TS     Moving from lying on back to sitting on the side of a flat bed without bedrails? 3  -TS     Moving to and from a bed to a chair (including a wheelchair)? 3  -TS     Standing up from a chair using your arms (e.g., wheelchair, bedside chair)? 3  -TS     Climbing 3-5 steps with a railing? 3  -TS     To walk in hospital room? 3  -TS     AM-PAC 6 Clicks Score (PT) 18  -TS     Highest Level of Mobility Goal 6 --> Walk 10 steps or more  -TS       Row Name 12/05/24 1400 12/05/24 0946       Functional Assessment    Outcome Measure Options AM-PAC 6 Clicks Daily Activity  (OT)  - AM-PAC 6 Clicks Basic Mobility (PT)  -              User Key  (r) = Recorded By, (t) = Taken By, (c) = Cosigned By      Initials Name Provider Type    Mera Wharton PTA Physical Therapist Assistant    Indira Whiteside, OTR/L, CSRS Occupational Therapist    Oswaldo Valenzuela RN Registered Nurse    Adrian Coto RN Registered Nurse                    Occupational Therapy Education       Title: PT OT SLP Therapies (In Progress)       Topic: Occupational Therapy (In Progress)       Point: ADL training (Done)       Description:   Instruct learner(s) on proper safety adaptation and remediation techniques during self care or transfers.   Instruct in proper use of assistive devices.                  Learning Progress Summary            Patient Acceptance, E, VU by  at 12/5/2024 1452    Acceptance, E, VU by  at 12/4/2024 1013                      Point: Home exercise program (Not Started)       Description:   Instruct learner(s) on appropriate technique for monitoring, assisting and/or progressing therapeutic exercises/activities.                  Learner Progress:  Not documented in this visit.              Point: Precautions (Done)       Description:   Instruct learner(s) on prescribed precautions during self-care and functional transfers.                  Learning Progress Summary            Patient Acceptance, E, VU by  at 12/5/2024 1452    Acceptance, E, VU by  at 12/4/2024 1013                      Point: Body mechanics (Done)       Description:   Instruct learner(s) on proper positioning and spine alignment during self-care, functional mobility activities and/or exercises.                  Learning Progress Summary            Patient Acceptance, E, VU by  at 12/5/2024 1452                                      User Key       Initials Effective Dates Name Provider Type [facility] 11/15/24 -  Indira Lzeama, OTR/L, KEATONS Occupational Therapist OT                  OT  Recommendation and Plan  Planned Therapy Interventions (OT): activity tolerance training, adaptive equipment training, functional balance retraining, transfer/mobility retraining, strengthening exercise, occupation/activity based interventions, neuromuscular control/coordination retraining, patient/caregiver education/training  Therapy Frequency (OT): 5 times/wk  Plan of Care Review  Plan of Care Reviewed With: patient  Outcome Evaluation: OT tx completed. Pt presents alert and oriented x4, sitting up in chair upon therapist entry to room. She is impulsive throughout session and demonstrated decreased safety awareness. Instead of putting the leg of the chair down, pt attempted to get off around the side of the chair. Educated on safety awareness for task. She worked on visual scanning to detect safety hazards during functional mobility to increase pt awareness and safety during ambulation. She was able to accurately located 3/4 safety situations in hallways. She was returned back to [facility] in bed at end of session with Sup only. Continue OT POC.     Time Calculation:         Time Calculation- OT       Row Name 12/05/24 1400 12/05/24 1317          Time Calculation- OT    OT Start Time 1400  - --     OT Stop Time 1427  - --     OT Time Calculation (min) 27 min  - --     Total Timed Code Minutes- OT 27 minute(s)  - --     OT Received On 12/05/24  - --        Timed Charges    72313 - Gait Training Minutes  -- 13  -     45653 - OT Self Care/Mgmt Minutes 27  - --        Total Minutes    Timed Charges Total Minutes 27  - 13  -      Total Minutes 27  - 13  -               User Key  (r) = Recorded By, (t) = Taken By, (c) = Cosigned By      Initials Name Provider Type    Mera Wharton PTA Physical Therapist Assistant    Indira Whiteside, OTR/L, CSRS Occupational Therapist                  Therapy Charges for Today       Code Description Service Date Service Provider Modifiers Qty     Render In Bullet Format When Appropriate: No 52174040897 HC OT EVAL MOD COMPLEXITY 3 12/4/2024 Indira Lezama OTR/L, CSRS GO 1    05360628189 HC OT SELF CARE/MGMT/TRAIN EA 15 MIN 12/5/2024 Indira Lezama OTR/L, CSRS GO 2                 Indira Lezama OTR/L, CSRS  12/5/2024   Notification Instructions: Patient will be notified of biopsy results. However, patient instructed to call the office if not contacted within 2 weeks. Depth Of Biopsy: dermis Electrodesiccation Text: The wound bed was treated with electrodesiccation after the biopsy was performed. Post-Care Instructions: I reviewed with the patient in detail post-care instructions. Patient is to keep the biopsy site dry overnight, and then apply bacitracin twice daily until healed. Patient may apply hydrogen peroxide soaks to remove any crusting. Anesthesia Volume In Cc (Will Not Render If 0): 1 Additional Anesthesia Volume In Cc (Will Not Render If 0): 0 Biopsy Method: Dermablade Anesthesia Type: 1% lidocaine with epinephrine Curettage Text: The wound bed was treated with curettage after the biopsy was performed. Size Of Lesion In Cm: 0.6 Cryotherapy Text: The wound bed was treated with cryotherapy after the biopsy was performed. Biopsy Type: H and E Hemostasis: Drysol Wound Care: Petrolatum X Size Of Lesion In Cm: 0.5 Billing Type: Third-Party Bill Was A Bandage Applied: Yes Dressing: bandage Consent: Written consent was obtained and risks were reviewed including but not limited to scarring, infection, bleeding, scabbing, incomplete removal, nerve damage and allergy to anesthesia. Type Of Destruction Used: Curettage Information: Selecting Yes will display possible errors in your note based on the variables you have selected. This validation is only offered as a suggestion for you. PLEASE NOTE THAT THE VALIDATION TEXT WILL BE REMOVED WHEN YOU FINALIZE YOUR NOTE. IF YOU WANT TO FAX A PRELIMINARY NOTE YOU WILL NEED TO TOGGLE THIS TO 'NO' IF YOU DO NOT WANT IT IN YOUR FAXED NOTE. Electrodesiccation And Curettage Text: The wound bed was treated with electrodesiccation and curettage after the biopsy was performed. Silver Nitrate Text: The wound bed was treated with silver nitrate after the biopsy was performed. Detail Level: Detailed

## 2024-12-05 NOTE — THERAPY TREATMENT NOTE
Acute Care - Physical Therapy Treatment Note  [facility]     Patient Name: Rosa Kunz  : 1940  MRN: [id number]  Today's Date: 2024      Visit Dx:     ICD-10-CM ICD-9-CM   1. Subdural hemorrhage  I62.00 432.1   2. Impaired mobility [Z74.09]  Z74.09 799.89     Patient Active Problem List   Diagnosis    SDH (subdural hematoma)     Past Medical History:   Diagnosis Date    Diabetes mellitus     GERD (gastroesophageal reflux disease)     Hypertension      Past Surgical History:   Procedure Laterality Date    CARON HOLE Right 12/3/2024    Procedure: CARON HOLE;  Surgeon: Timo Bailey MD;  Location: [facility];  Service: Neurosurgery;  Laterality: Right;    CHOLECYSTECTOMY      HEMORRHOIDECTOMY      CORTEZ'S NEUROMA EXCISION Left      PT Assessment (Last 12 Hours)       PT Evaluation and Treatment       Row Name 24          Physical Therapy Time and Intention    Subjective Information no complaints  -     Document Type therapy note (daily note)  -     Mode of Treatment physical therapy  -     Comment impulsive   -       Row Name 24          General Information    Existing Precautions/Restrictions fall   subdural drain  -       Row Name 2446          Pain    Pretreatment Pain Rating 0/10 - no pain  -     Posttreatment Pain Rating 0/10 - no pain  -       Row Name 24          Bed Mobility    Supine-Sit [place] (Bed Mobility) standby assist  -     Assistive Device (Bed Mobility) head of bed elevated  -     Comment, (Bed Mobility) pt had to be told to not to move because she kept trying to get up before PTA was ready.  -       Row Name 24          Sit-Stand Transfer    Sit-Stand [place] (Transfers) contact guard  -       Row Name 2446          Stand-Sit Transfer    Stand-Sit [place] (Transfers) contact guard  -       Row Name 2446          Toilet Transfer    Type (Toilet Transfer) stand pivot/stand  step  -     [place] Level (Toilet Transfer) verbal cues;contact guard  -     Assistive Device (Toilet Transfer) commode, bedside without drop arms  -     Comment, (Toilet Transfer) pt stood and was able to wipe herself.  -       Row Name 12/05/24 0946          Gait/Stairs (Locomotion)    [place] Level (Gait) verbal cues;contact guard;minimum assist (75% patient effort)  -     Distance in Feet (Gait) 100  x 2  -     Deviations/Abnormal Patterns (Gait) stride length decreased;base of support, narrow  -MF     Bilateral Gait Deviations heel strike decreased  -     Comment, (Gait/Stairs) Cues to slow down and to stop to regain her balance. Increased sway   -       Row Name 12/05/24 0946          Motor Skills    Comments, Therapeutic Exercise AROM B LE x 15 reps seated in chair  -       Row Name             Wound 12/03/24 1743 Right scalp surgical    Wound - Properties Group Placement Date: 12/03/24  -KM Placement Time: 1743  -KM Side: Right  -KM Location: scalp  -KM Primary Wound Type: Incision  -KM Type: surgical  -KM Present on Original Admission: N  -KM    Retired Wound - Properties Group Placement Date: 12/03/24  -KM Placement Time: 1743  -KM Present on Original Admission: N  -KM Side: Right  -KM Location: scalp  -KM Primary Wound Type: Incision  -KM Type: surgical  -KM    Retired Wound - Properties Group Placement Date: 12/03/24  -KM Placement Time: 1743  -KM Present on Original Admission: N  -KM Side: Right  -KM Location: scalp  -KM Primary Wound Type: Incision  -KM Type: surgical  -KM    Retired Wound - Properties Group Date first assessed: 12/03/24  -KM Time first assessed: 1743  -KM Present on Original Admission: N  -KM Side: Right  -KM Location: scalp  -KM Primary Wound Type: Incision  -KM Type: surgical  -KM      Row Name 12/05/24 0946          Plan of Care Review    Plan of Care Reviewed With patient  -     Progress improving  -     Outcome Evaluation Pt. eager for therapy. She  was impulsive at beginning of tx and had to be told to not get up without PTA being near by. Pt. reports being independent at home prior to sx. She was SBA for bed mobility and CGA for transfers. She was able to perform toileting with CGA/SBA and able to perform hygiene herself. She ambulated around unit, but did require MIN assist at times due to decreased balance. She walked at a fast pace and demonstrated an increased upper body sway. Pt. is strong, but needs some work on her balance. Will continue to work on this while she is here.  -       Row Name 12/05/24 0946          Positioning and Restraints    Pre-Treatment Position in bed  -     Post Treatment Position chair  -MF     In Chair reclined;call light within reach;encouraged to call for assist;notified nsg;patient within staff view  -               User Key  (r) = Recorded By, (t) = Taken By, (c) = Cosigned By      Initials Name Provider Type    Mera Wharton, PTA Physical Therapist Assistant    Brandy Ashford, RN Registered Nurse                    Physical Therapy Education       Title: PT OT SLP Therapies (In Progress)       Topic: Physical Therapy (In Progress)       Point: Mobility training (Done)       Learning Progress Summary            Patient Acceptance, E, VU,DU by ANATOLIY at 12/4/2024 0986    Comment: benefits of activity, progression of PT                      Point: Home exercise program (Not Started)       Learner Progress:  Not documented in this visit.              Point: Body mechanics (Not Started)       Learner Progress:  Not documented in this visit.              Point: Precautions (Not Started)       Learner Progress:  Not documented in this visit.                              User Key       Initials Effective Dates Name Provider Type Discipline    ANATOLIY 02/03/23 -  Anton Haile PT DPT Physical Therapist PT                  PT Recommendation and Plan     Plan of Care Reviewed With: patient  Progress: improving  Outcome  Evaluation: Pt. eager for therapy. She was impulsive at beginning of tx and had to be told to not get up without PTA being near by. Pt. reports being independent at home prior to sx. She was SBA for bed mobility and CGA for transfers. She was able to perform toileting with CGA/SBA and able to perform hygiene herself. She ambulated around unit, but did require MIN assist at times due to decreased balance. She walked at a fast pace and demonstrated an increased upper body sway. Pt. is strong, but needs some work on her balance. Will continue to work on this while she is here.   Outcome Measures       Row Name 12/05/24 0946             How much help from another person do you currently need...    Turning from your back to your side while in flat bed without using bedrails? 4  -MF      Moving from lying on back to sitting on the side of a flat bed without bedrails? 3  -MF      Moving to and from a bed to a chair (including a wheelchair)? 3  -MF      Standing up from a chair using your arms (e.g., wheelchair, bedside chair)? 3  -MF      Climbing 3-5 steps with a railing? 3  -MF      To walk in hospital room? 3  -MF      AM-PAC 6 Clicks Score (PT) 19  -MF         Functional Assessment    Outcome Measure Options AM-PAC 6 Clicks Basic Mobility (PT)  -MF                User Key  (r) = Recorded By, (t) = Taken By, (c) = Cosigned By      Initials Name Provider Type    Mera Wharton, PTA Physical Therapist Assistant                     Time Calculation:    PT Charges       Row Name 12/05/24 1317             Time Calculation    Start Time 0946  -      Stop Time 1009  -      Time Calculation (min) 23 min  -MF      PT Received On 12/05/24  -         Time Calculation- PT    Total Timed Code Minutes- PT 23 minute(s)  -MF         Timed Charges    03197 - PT Therapeutic Exercise Minutes 10  -MF      78684 - Gait Training Minutes  13  -MF         Total Minutes    Timed Charges Total Minutes 23  -MF       Total Minutes 23   -                User Key  (r) = Recorded By, (t) = Taken By, (c) = Cosigned By      Initials Name Provider Type     Mera Dowd PTA Physical Therapist Assistant                  Therapy Charges for Today       Code Description Service Date Service Provider Modifiers Qty    49775159080 HC PT THER PROC EA 15 MIN 12/5/2024 Mera Dowd PTA  1    00563123546 HC GAIT TRAINING EA 15 MIN 12/5/2024 Mera Dowd PTA GP 1            PT G-Codes  Outcome Measure Options: AM-PAC 6 Clicks Basic Mobility (PT)  AM-PAC 6 Clicks Score (PT): 19  AM-PAC 6 Clicks Score (OT): 21    Mera Dowd PTA  12/5/2024

## 2024-12-05 NOTE — PLAN OF CARE
Goal Outcome Evaluation:  Plan of Care Reviewed With: patient           Outcome Evaluation: OT tx completed. Pt presents alert and oriented x4, sitting up in chair upon therapist entry to room. She is impulsive throughout session and demonstrated decreased safety awareness. Instead of putting the leg of the chair down, pt attempted to get off around the side of the chair. Educated on safety awareness for task. She worked on visual scanning to detect safety hazards during functional mobility to increase pt awareness and safety during ambulation. She was able to accurately located 3/4 safety situations in hallways. She was returned back to [facility] in bed at end of session with Sup only. Continue OT POC.    Anticipated Discharge Disposition (OT): home with assist, home with home health

## 2024-12-05 NOTE — PLAN OF CARE
CHECK ONBASE FOR SCAN     Goal Outcome Evaluation:  Plan of Care Reviewed With: patient        Progress: improving  Outcome Evaluation: Pt. eager for therapy. She was impulsive at beginning of tx and had to be told to not get up without PTA being near by. Pt. reports being independent at home prior to sx. She was SBA for bed mobility and CGA for transfers. She was able to perform toileting with CGA/SBA and able to perform hygiene herself. She ambulated around unit, but did require MIN assist at times due to decreased balance. She walked at a fast pace and demonstrated an increased upper body sway. Pt. is strong, but needs some work on her balance. Will continue to work on this while she is here.

## 2024-12-05 NOTE — PROGRESS NOTES
[facility] Intensivist Services  Progress Note    Patient Name: Rosa Kunz  Date of Admission: 12/3/2024  Today's Date: 12/05/24  Length of Stay: 2  Primary Care Physician: Jermaine Wolff MD    Subjective     Chief Complaint: Subdural hematoma    HPI:  No acute events overnight, patient sitting in the bedside chair breathing comfortably on room air.  Denies any headache or other complaints.    Review of Systems:  All pertinent negatives and positives are as above. All other systems have been reviewed and are negative unless otherwise stated.     ICU Course     Summary:  Mrs. Kunz is an 84-year-old female admitted to [facility] ICU following neurosurgical intervention for right subdural hematoma.  Presented to the ED overnight with left-sided left hand paresthesias.  Workup in the ED included a head CT which showed a right subdural hematoma with midline shift.  Notably the patient did have a fall 6 weeks ago.  Head CT at that time was negative.  Will continue postop neurosurgical care.  Evaluated by neurosurgery on 12/5/2024, okay for downgrade to the floor once drain is removed.    Objective      Physical Exam:  General: Well appearing, in no respiratory distress  Head: Postop changes noted with drain in place  Eyes: Conjunctiva clear, sclera non-icteric  Teeth/Gums: Normal inspection  Neck: Supple without stridor  Heart: Regular rate and rhythm, no murmur  Lungs: Clear to auscultation bilaterally, no wheezing  Abdomen: Soft, nontender  MSK/extremities: No cyanosis or edema  Neurologic: AOx3, similar paresthesias of the left hand      Results Review:  Results from last 7 days   Lab Units 12/05/24  0231 12/04/24  0346 12/03/24  1330   SODIUM mmol/L 138 134* 132*   POTASSIUM mmol/L 3.9 4.0 4.2   CHLORIDE mmol/L 104 98 91*   CO2 mmol/L 26.0 26.0 29.0   BUN mg/dL 13 10 9   CALCIUM mg/dL 7.7* 8.0* 8.7     Results from last 7 days   Lab Units 12/05/24  0231 12/04/24  0346 12/03/24  1330  "  WBC 10*3/mm3 5.31 4.83 7.73   HEMOGLOBIN g/dL 10.7* 11.7* 13.8   HEMATOCRIT % 32.4* 35.4 43.3   PLATELETS 10*3/mm3 154 161 219     Visit Vitals  /61   Pulse 68   Temp 97.6 °F (36.4 °C) (Oral)   Resp 15   Ht 154.9 cm (61\")   Wt 83 kg (182 lb 15.7 oz)   SpO2 96%   BMI 34.57 kg/m²       Medications:  Chlorhexidine Gluconate Cloth, 1 Application, Topical, Once  Chlorhexidine Gluconate Cloth, 1 Application, Topical, Q24H  enalapril, 20 mg, Oral, Daily  FLUoxetine, 20 mg, Oral, Daily  insulin lispro, 2-9 Units, Subcutaneous, 4x Daily AC & at Bedtime  levETIRAcetam, 500 mg, Oral, BID  metoprolol tartrate, 25 mg, Oral, Q12H  montelukast, 10 mg, Oral, Nightly  mupirocin, 1 Application, Each Nare, BID  senna-docusate sodium, 2 tablet, Oral, BID  sodium chloride, 10 mL, Intravenous, Q12H      niCARdipine, 5-15 mg/hr, Last Rate: Stopped (12/03/24 2000)  sodium chloride, 75 mL/hr, Last Rate: 75 mL/hr (12/03/24 2007)  sodium chloride, 100 mL/hr, Last Rate: 100 mL/hr (12/05/24 0540)          Assessment/Plan     Problems:  Subdural hematoma with midline shift  Status post right craniotomy  Left hand paresthesias  Diabetes  Hypertension  GERD     Plan:  Subdural hematoma, status post right craniotomy  -Postop neurosurgical care per Dr. Bailey  -AED per neurosurgery  -CT of the head pending today  -As needed pain management ordered  -PT/OT     Diabetes  -Continue sliding scale insulin with Accu-Cheks ACHS  -Blood glucose 140-180     Hypertension  -Blood pressure management per primary  -Required Cardene for a short time, has been off since 8 PM yesterday evening     GERD  -Pepcid as needed      Critical Care Set:  Feeding: By mouth diet  Analgesia: As needed ordered  Sedation: N/A  Thromboprophylaxis: SCDs  HOB: 30+  Ulcer prophylaxis: N/A  Glycemic control: Sliding scale ordered  SBT: N/A  Bowel movement: Regimen ordered, will give MiraLAX today  Catheter removal: N/A  Medication de-escalation: N/A    Disposition  Okay for " downgrade to the floor once drain is removed by neurosurgery.      Arnol Sandhu DO  Pulmonary Critical Care Medicine  12/5/2024  12:30 CST

## 2024-12-06 ENCOUNTER — READMISSION MANAGEMENT (OUTPATIENT)
Dept: CALL CENTER | Facility: HOSPITAL | Age: 84
End: 2024-12-06
Payer: MEDICARE

## 2024-12-06 ENCOUNTER — APPOINTMENT (OUTPATIENT)
Dept: CT IMAGING | Facility: HOSPITAL | Age: 84
DRG: 027 | End: 2024-12-06
Payer: MEDICARE

## 2024-12-06 VITALS
SYSTOLIC BLOOD PRESSURE: 156 MMHG | WEIGHT: 177.03 LBS | DIASTOLIC BLOOD PRESSURE: 60 MMHG | TEMPERATURE: 97.9 F | BODY MASS INDEX: 33.42 KG/M2 | HEART RATE: 61 BPM | RESPIRATION RATE: 19 BRPM | OXYGEN SATURATION: 90 % | HEIGHT: 61 IN

## 2024-12-06 PROBLEM — H35.30 AGE-RELATED MACULAR DEGENERATION: Status: ACTIVE | Noted: 2022-02-16

## 2024-12-06 PROBLEM — F51.01 PRIMARY INSOMNIA: Status: ACTIVE | Noted: 2024-12-06

## 2024-12-06 PROBLEM — H90.0 CONDUCTIVE HEARING LOSS, BILATERAL: Status: ACTIVE | Noted: 2022-02-16

## 2024-12-06 PROBLEM — E66.9 OBESITY: Status: ACTIVE | Noted: 2024-12-06

## 2024-12-06 PROBLEM — R74.8 HIGH SERUM ALKALINE PHOSPHATASE LEVEL: Status: ACTIVE | Noted: 2023-08-22

## 2024-12-06 PROBLEM — E78.2 MIXED HYPERLIPIDEMIA: Status: ACTIVE | Noted: 2024-12-06

## 2024-12-06 PROBLEM — K21.9 GASTROESOPHAGEAL REFLUX DISEASE WITHOUT ESOPHAGITIS: Status: ACTIVE | Noted: 2024-12-06

## 2024-12-06 PROBLEM — I10 ESSENTIAL HYPERTENSION: Status: ACTIVE | Noted: 2024-12-06

## 2024-12-06 PROBLEM — E11.9 TYPE 2 DIABETES MELLITUS: Status: ACTIVE | Noted: 2024-12-06

## 2024-12-06 PROBLEM — M19.90 OSTEOARTHRITIS: Status: ACTIVE | Noted: 2024-12-06

## 2024-12-06 LAB
ANION GAP SERPL CALCULATED.3IONS-SCNC: 6 MMOL/L (ref 5–15)
BASOPHILS # BLD AUTO: 0.02 10*3/MM3 (ref 0–0.2)
BASOPHILS NFR BLD AUTO: 0.4 % (ref 0–1.5)
BUN SERPL-MCNC: 11 MG/DL (ref 8–23)
BUN/CREAT SERPL: 21.2 (ref 7–25)
CALCIUM SPEC-SCNC: 8.3 MG/DL (ref 8.6–10.5)
CHLORIDE SERPL-SCNC: 103 MMOL/L (ref 98–107)
CO2 SERPL-SCNC: 28 MMOL/L (ref 22–29)
CREAT SERPL-MCNC: 0.52 MG/DL (ref 0.57–1)
DEPRECATED RDW RBC AUTO: 48.4 FL (ref 37–54)
EGFRCR SERPLBLD CKD-EPI 2021: 91.7 ML/MIN/1.73
EOSINOPHIL # BLD AUTO: 0.11 10*3/MM3 (ref 0–0.4)
EOSINOPHIL NFR BLD AUTO: 2.2 % (ref 0.3–6.2)
ERYTHROCYTE [DISTWIDTH] IN BLOOD BY AUTOMATED COUNT: 13.6 % (ref 12.3–15.4)
GLUCOSE BLDC GLUCOMTR-MCNC: 65 MG/DL (ref 70–130)
GLUCOSE BLDC GLUCOMTR-MCNC: 89 MG/DL (ref 70–130)
GLUCOSE BLDC GLUCOMTR-MCNC: 90 MG/DL (ref 70–130)
GLUCOSE SERPL-MCNC: 94 MG/DL (ref 65–99)
HCT VFR BLD AUTO: 34.8 % (ref 34–46.6)
HGB BLD-MCNC: 11.1 G/DL (ref 12–15.9)
IMM GRANULOCYTES # BLD AUTO: 0.02 10*3/MM3 (ref 0–0.05)
IMM GRANULOCYTES NFR BLD AUTO: 0.4 % (ref 0–0.5)
LYMPHOCYTES # BLD AUTO: 1.67 10*3/MM3 (ref 0.7–3.1)
LYMPHOCYTES NFR BLD AUTO: 33.4 % (ref 19.6–45.3)
MCH RBC QN AUTO: 30.9 PG (ref 26.6–33)
MCHC RBC AUTO-ENTMCNC: 31.9 G/DL (ref 31.5–35.7)
MCV RBC AUTO: 96.9 FL (ref 79–97)
MONOCYTES # BLD AUTO: 0.44 10*3/MM3 (ref 0.1–0.9)
MONOCYTES NFR BLD AUTO: 8.8 % (ref 5–12)
NEUTROPHILS NFR BLD AUTO: 2.74 10*3/MM3 (ref 1.7–7)
NEUTROPHILS NFR BLD AUTO: 54.8 % (ref 42.7–76)
NRBC BLD AUTO-RTO: 0 /100 WBC (ref 0–0.2)
PLATELET # BLD AUTO: 166 10*3/MM3 (ref 140–450)
PMV BLD AUTO: 9.6 FL (ref 6–12)
POTASSIUM SERPL-SCNC: 3.6 MMOL/L (ref 3.5–5.2)
QT INTERVAL: 402 MS
QTC INTERVAL: 424 MS
RBC # BLD AUTO: 3.59 10*6/MM3 (ref 3.77–5.28)
SODIUM SERPL-SCNC: 137 MMOL/L (ref 136–145)
WBC NRBC COR # BLD AUTO: 5 10*3/MM3 (ref 3.4–10.8)

## 2024-12-06 PROCEDURE — 97116 GAIT TRAINING THERAPY: CPT

## 2024-12-06 PROCEDURE — 99024 POSTOP FOLLOW-UP VISIT: CPT | Performed by: NURSE PRACTITIONER

## 2024-12-06 PROCEDURE — 85025 COMPLETE CBC W/AUTO DIFF WBC: CPT | Performed by: NURSE PRACTITIONER

## 2024-12-06 PROCEDURE — 82948 REAGENT STRIP/BLOOD GLUCOSE: CPT

## 2024-12-06 PROCEDURE — 80048 BASIC METABOLIC PNL TOTAL CA: CPT | Performed by: NURSE PRACTITIONER

## 2024-12-06 PROCEDURE — 70450 CT HEAD/BRAIN W/O DYE: CPT

## 2024-12-06 RX ORDER — LOVASTATIN 20 MG/1
20 TABLET ORAL NIGHTLY
COMMUNITY

## 2024-12-06 RX ORDER — PANTOPRAZOLE SODIUM 40 MG/1
40 TABLET, DELAYED RELEASE ORAL DAILY
COMMUNITY

## 2024-12-06 RX ORDER — HYDROCHLOROTHIAZIDE 25 MG/1
25 TABLET ORAL DAILY
COMMUNITY

## 2024-12-06 RX ORDER — LEVETIRACETAM 500 MG/1
500 TABLET ORAL 2 TIMES DAILY
Qty: 60 TABLET | Refills: 0 | Status: SHIPPED | OUTPATIENT
Start: 2024-12-06 | End: 2024-12-06 | Stop reason: HOSPADM

## 2024-12-06 RX ORDER — LEVETIRACETAM 500 MG/1
500 TABLET ORAL 2 TIMES DAILY
Qty: 14 TABLET | Refills: 0 | Status: SHIPPED | OUTPATIENT
Start: 2024-12-06

## 2024-12-06 RX ORDER — ASPIRIN 81 MG/1
81 TABLET ORAL DAILY
COMMUNITY
End: 2024-12-06 | Stop reason: HOSPADM

## 2024-12-06 RX ADMIN — LEVETIRACETAM 500 MG: 500 TABLET, FILM COATED ORAL at 10:00

## 2024-12-06 RX ADMIN — Medication 1 APPLICATION: at 10:00

## 2024-12-06 RX ADMIN — ENALAPRIL MALEATE 20 MG: 10 TABLET ORAL at 10:00

## 2024-12-06 RX ADMIN — METOPROLOL TARTRATE 25 MG: 25 TABLET, FILM COATED ORAL at 10:00

## 2024-12-06 RX ADMIN — Medication 10 ML: at 09:00

## 2024-12-06 RX ADMIN — CHLORHEXIDINE GLUCONATE 1 APPLICATION: 500 CLOTH TOPICAL at 04:04

## 2024-12-06 RX ADMIN — FLUOXETINE HYDROCHLORIDE 20 MG: 20 CAPSULE ORAL at 10:00

## 2024-12-06 NOTE — THERAPY DISCHARGE NOTE
Acute Care - Occupational Therapy Discharge Summary  [facility]     Patient Name: Rosa Kunz  : 1940  MRN: [id number]    Today's Date: 2024                 Admit Date: 12/3/2024        OT Recommendation and Plan    Visit Dx:    ICD-10-CM ICD-9-CM   1. Subdural hemorrhage  I62.00 432.1   2. Impaired mobility [Z74.09]  Z74.09 799.89          Time Calculation- OT       Row Name 24 1015             Timed Charges    62544 - Gait Training Minutes  24  -WK         Total Minutes    Timed Charges Total Minutes 24  -WK       Total Minutes 24  -WK                User Key  (r) = Recorded By, (t) = Taken By, (c) = Cosigned By      Initials Name Provider Type    Chasidy Coelho PTA Physical Therapist Assistant                       OT Rehab Goals       Row Name 24 1300             Dressing Goal 1 (OT)    Activity/Device (Dressing Goal 1, OT) dressing skills, all  -JW      [place]/Cues Needed (Dressing Goal 1, OT) modified independence  -JW      Time Frame (Dressing Goal 1, OT) long term goal (LTG);by discharge  -JW      Progress/Outcome (Dressing Goal 1, OT) goal not met;discharged from facility  -JW         Toileting Goal 1 (OT)    Activity/Device (Toileting Goal 1, OT) toileting skills, all  -JW      [place] Level/Cues Needed (Toileting Goal 1, OT) modified independence  -JW      Time Frame (Toileting Goal 1, OT) long term goal (LTG);by discharge  -JW      Progress/Outcome (Toileting Goal 1, OT) goal not met;discharged from facility  -JW         Problem Specific Goal 1 (OT)    Problem Specific Goal 1 (OT) Pt will independently complete [facility] HEP to improve independence with adls.  -JW      Time Frame (Problem Specific Goal 1, OT) long term goal (LTG);by discharge  -JW      Progress/Outcome (Problem Specific Goal 1, OT) goal not met;discharged from facility  -                User Key  (r) = Recorded By, (t) = Taken By, (c) = Cosigned By      Initials Name Provider Type Discipline     Indira Whiteside, OTR/L, CSRS Occupational Therapist OT                     Outcome Measures       Row Name 12/06/24 0949 12/05/24 0946          How much help from another person do you currently need...    Turning from your back to your side while in flat bed without using bedrails? 4  -WK 4  -MF     Moving from lying on back to sitting on the side of a flat bed without bedrails? 4  -WK 3  -MF     Moving to and from a bed to a chair (including a wheelchair)? 3  -WK 3  -MF     Standing up from a chair using your arms (e.g., wheelchair, bedside chair)? 4  -WK 3  -MF     Climbing 3-5 steps with a railing? 3  -WK 3  -MF     To walk in hospital room? 3  -WK 3  -MF     AM-PAC 6 Clicks Score (PT) 21  -WK 19  -MF        Functional Assessment    Outcome Measure Options AM-PAC 6 Clicks Basic Mobility (PT)  -WK AM-PAC 6 Clicks Basic Mobility (PT)  -MF               User Key  (r) = Recorded By, (t) = Taken By, (c) = Cosigned By      Initials Name Provider Type     Mera Dowd PTA Physical Therapist Assistant    WK Chasidy Onofre PTA Physical Therapist Assistant                    Timed Therapy Charges  Total Units: 2      Suggested Charges  Total Units: 2      Procedure Name Documented Minutes Units Code    HC OT SELF CARE/MGMT/TRAIN EA 15 MIN 27 2   56597 (CPT®)                 Documented Minutes  Total Minutes: 27      Therapy Provided Minutes    42104 - OT Self Care/Mgmt Minutes 27                    Therapy Charges for Today       Code Description Service Date Service Provider Modifiers Qty    02990631507 HC OT SELF CARE/MGMT/TRAIN EA 15 MIN 12/5/2024 Indira Lezama, OTR/L, CSRS GO 2            OT Discharge Summary  Anticipated Discharge Disposition (OT): home with assist, home with home health  Reason for Discharge: Discharge from facility  Outcomes Achieved: Refer to plan of care for updates on goals achieved  Discharge Destination: Home with assist, Home with home health      Indira  KEVIN Lezama/L, CSRS  12/6/2024

## 2024-12-06 NOTE — DISCHARGE INSTR - APPOINTMENTS
Dr Jermaine Denis on Friday December 13 @9:00 AM        You will be contacted for follow up CT to be done same day as your appointment with Terell Mayfield

## 2024-12-06 NOTE — DISCHARGE SUMMARY
[facility] Intensivist Services  Discharge Note     Patient Name: Rosa Kunz  Date of Admission: 12/3/2024  Today's Date: 24  Length of Stay: 2  Primary Care Physician: Jermaine Wolff MD  :  1940  MRN: [id number]     Acct: [id number]   Admit Date:  12/3/2024  Discharge date: 2024  Attending Provider: Daniel Sandhu*                                     Admission Diagnoses:   SDH (subdural hematoma) [S06.5XAA]    Discharge Diagnoses:     SDH (subdural hematoma)    Essential hypertension    Gastroesophageal reflux disease without esophagitis    Mixed hyperlipidemia    Type 2 diabetes mellitus      Consults:   neurology and neurosurgery      Brief Inpatient course:  Mrs. Kunz is an 84-year-old female admitted to [facility] ICU following neurosurgical intervention for right subdural hematoma.  Presented to the ED 12/3/2024 with left-sided left hand paresthesias.  Workup in the ED included a head CT which showed a right subdural hematoma with midline shift.  Notably the patient did have a fall 6 weeks ago.  Head CT at that time was negative.  She underwent craniotomy with neurosurgery for evacuation and drain placement.  She did well postoperatively.  Drain was removed 2024.  She had a follow-up head CT on 2024.  She was evaluated by neurosurgery the same day.  Neurosurgery felt the patient was stable for discharge home.  The rest of the patient's critical care stay was uneventful.  She was felt stable for discharge home on 2024 with home health.  She will follow-up with neurosurgery and her PCP.  Neurosurgery has placed a CT outpatient.  She will continue to hold her aspirin until follow-up.  Prescription given for Keppra per neurosurgery.    Procedures: Right frontal craniotomy      Discharge Functional Status:    Functional    Disposition:   home with home health      Patient Instructions:   Activity: activity as tolerated  Diet: regular  diet    Follow-up with PCP and neurosurgery as ordered.    Discharge Medications:  See completed med rec      Samantha Sandhu DO  12/6/2024  13:48 CST

## 2024-12-06 NOTE — PLAN OF CARE
Problem: Adult Inpatient Plan of Care  Goal: Plan of Care Review  12/6/2024 0334 by Izzy Ribeiro RN  Outcome: Progressing  Flowsheets (Taken 12/4/2024 0926 by Anton Haile, PT DPT)  Plan of Care Reviewed With: patient  12/6/2024 0333 by Izzy Ribeiro RN  Outcome: Progressing  Flowsheets (Taken 12/4/2024 0926 by Anton Haile, PT DPT)  Plan of Care Reviewed With: patient  Goal: Patient-Specific Goal (Individualized)  12/6/2024 0334 by Izzy Ribeiro RN  Outcome: Progressing  12/6/2024 0333 by Izzy Ribeiro RN  Outcome: Progressing  Goal: Absence of Hospital-Acquired Illness or Injury  12/6/2024 0334 by Izzy Ribeiro RN  Outcome: Progressing  12/6/2024 0333 by Izzy Ribeiro RN  Outcome: Progressing  Intervention: Identify and Manage Fall Risk  Recent Flowsheet Documentation  Taken 12/6/2024 0300 by Izzy Ribeiro RN  Safety Promotion/Fall Prevention: safety round/check completed  Taken 12/6/2024 0200 by Izzy Ribeiro RN  Safety Promotion/Fall Prevention: safety round/check completed  Taken 12/6/2024 0100 by Izzy Ribeiro RN  Safety Promotion/Fall Prevention: safety round/check completed  Taken 12/6/2024 0000 by Izzy Ribeiro RN  Safety Promotion/Fall Prevention: safety round/check completed  Taken 12/5/2024 2300 by Izzy Ribeiro RN  Safety Promotion/Fall Prevention: safety round/check completed  Taken 12/5/2024 2200 by Izzy Ribeiro RN  Safety Promotion/Fall Prevention: safety round/check completed  Taken 12/5/2024 2100 by Izzy Ribeiro RN  Safety Promotion/Fall Prevention: safety round/check completed  Taken 12/5/2024 2000 by Izzy Ribeiro RN  Safety Promotion/Fall Prevention: safety round/check completed  Taken 12/5/2024 1900 by Izzy Ribeiro RN  Safety Promotion/Fall Prevention: safety round/check completed  Intervention: Prevent Skin Injury  Recent Flowsheet Documentation  Taken 12/6/2024 0300 by Izzy Ribeiro RN  Body Position:   turned   supine   position changed independently  Taken 12/6/2024 0100 by  Izzy Ribeiro RN  Body Position:   turned   right   position changed independently  Taken 12/5/2024 2300 by Izzy Ribeiro RN  Body Position:   turned   left   position changed independently  Taken 12/5/2024 2100 by Izzy Ribeiro RN  Body Position:   turned   supine   position changed independently  Taken 12/5/2024 2000 by Izzy Ribeiro RN  Skin Protection:   incontinence pads utilized   silicone foam dressing in place  Taken 12/5/2024 1900 by Izzy Ribeiro RN  Body Position:   turned   left   position changed independently  Intervention: Prevent and Manage VTE (Venous Thromboembolism) Risk  Recent Flowsheet Documentation  Taken 12/5/2024 2000 by Izzy Ribeiro RN  VTE Prevention/Management:   bilateral   SCDs (sequential compression devices) on  Intervention: Prevent Infection  Recent Flowsheet Documentation  Taken 12/5/2024 2000 by Izzy Ribeiro RN  Infection Prevention:   rest/sleep promoted   single patient room provided   hand hygiene promoted  Goal: Optimal Comfort and Wellbeing  12/6/2024 0334 by Izzy Ribeiro RN  Outcome: Progressing  12/6/2024 0333 by Izzy Ribeiro RN  Outcome: Progressing  Intervention: Provide Person-Centered Care  Recent Flowsheet Documentation  Taken 12/5/2024 2000 by Izzy Ribeiro RN  Trust Relationship/Rapport:   care explained   choices provided  Goal: Readiness for Transition of Care  12/6/2024 0334 by Izzy Ribeiro RN  Outcome: Progressing  12/6/2024 0333 by Izzy Ribeiro RN  Outcome: Progressing     Problem: Skin Injury Risk Increased  Goal: Skin Health and Integrity  12/6/2024 0334 by Izzy Ribeiro RN  Outcome: Progressing  12/6/2024 0333 by Izzy Ribeiro RN  Outcome: Progressing  Intervention: Optimize Skin Protection  Recent Flowsheet Documentation  Taken 12/6/2024 0300 by Izzy Ribeiro RN  Head of Bed (HOB) Positioning: HOB at 30 degrees  Taken 12/6/2024 0100 by Izzy Ribeiro RN  Head of Bed (HOB) Positioning: HOB at 30 degrees  Taken 12/5/2024 2300 by Izzy Ribeiro RN  Head of  Bed (HOB) Positioning: HOB at 30 degrees  Taken 12/5/2024 2100 by Izzy Ribeiro RN  Head of Bed ([facility]) Positioning: HOB at 30 degrees  Taken 12/5/2024 2000 by Izzy Ribeiro RN  Activity Management: bedrest  Pressure Reduction Techniques: frequent weight shift encouraged  Pressure Reduction Devices:   pressure-redistributing mattress utilized   positioning supports utilized   foam padding utilized  Skin Protection:   incontinence pads utilized   silicone foam dressing in place  Taken 12/5/2024 1900 by Izzy Ribeiro RN  Head of Bed ([facility]) Positioning: HOB at 30 degrees     Problem: Fall Injury Risk  Goal: Absence of Fall and Fall-Related Injury  12/6/2024 0334 by Izzy Ribeiro RN  Outcome: Progressing  12/6/2024 0333 by Izzy Ribeiro RN  Outcome: Progressing  Intervention: Identify and Manage Contributors  Recent Flowsheet Documentation  Taken 12/5/2024 2000 by Izzy Ribeiro RN  Medication Review/Management: medications reviewed  Intervention: Promote Injury-Free Environment  Recent Flowsheet Documentation  Taken 12/6/2024 0300 by Izzy Ribeiro RN  Safety Promotion/Fall Prevention: safety round/check completed  Taken 12/6/2024 0200 by Izzy Ribeiro RN  Safety Promotion/Fall Prevention: safety round/check completed  Taken 12/6/2024 0100 by Izzy Ribeiro RN  Safety Promotion/Fall Prevention: safety round/check completed  Taken 12/6/2024 0000 by Izzy Ribeiro RN  Safety Promotion/Fall Prevention: safety round/check completed  Taken 12/5/2024 2300 by Izzy Ribeiro RN  Safety Promotion/Fall Prevention: safety round/check completed  Taken 12/5/2024 2200 by Izzy Ribeiro RN  Safety Promotion/Fall Prevention: safety round/check completed  Taken 12/5/2024 2100 by Izzy Ribeiro RN  Safety Promotion/Fall Prevention: safety round/check completed  Taken 12/5/2024 2000 by Izzy Ribeiro RN  Safety Promotion/Fall Prevention: safety round/check completed  Taken 12/5/2024 1900 by Izzy Ribeiro RN  Safety Promotion/Fall Prevention: safety  round/check completed   Goal Outcome Evaluation:            pt transferring to bedside commode for elimination with standby to x1 assist. No complaints of pain. Drain dcd on day shift. Fluids dcd on day shift. Neuro checks WNL. VSS. Floor orders. Awaiting bed.

## 2024-12-06 NOTE — THERAPY TREATMENT NOTE
Acute Care - Physical Therapy Treatment Note  [facility]     Patient Name: Rosa Kunz  : 1940  MRN: [id number]  Today's Date: 2024      Visit Dx:     ICD-10-CM ICD-9-CM   1. Subdural hemorrhage  I62.00 432.1   2. Impaired mobility [Z74.09]  Z74.09 799.89     Patient Active Problem List   Diagnosis    SDH (subdural hematoma)    Age-related macular degeneration    Conductive hearing loss, bilateral    Essential hypertension    Gastroesophageal reflux disease without esophagitis    High serum alkaline phosphatase level    Mixed hyperlipidemia    Obesity    Osteoarthritis    Type 2 diabetes mellitus    Primary insomnia     Past Medical History:   Diagnosis Date    Diabetes mellitus     GERD (gastroesophageal reflux disease)     Hypertension      Past Surgical History:   Procedure Laterality Date    CARON HOLE Right 12/3/2024    Procedure: CARON HOLE;  Surgeon: Timo Bailey MD;  Location: [facility];  Service: Neurosurgery;  Laterality: Right;    CHOLECYSTECTOMY      HEMORRHOIDECTOMY      CORTEZ'S NEUROMA EXCISION Left      PT Assessment (Last 12 Hours)       PT Evaluation and Treatment       Row Name 24 0949          Physical Therapy Time and Intention    Subjective Information no complaints  -WK     Document Type therapy note (daily note)  -WK     Mode of Treatment physical therapy  -WK       Row Name 24 0949          General Information    Existing Precautions/Restrictions fall  -WK       Row Name 24 0949          Pain    Pretreatment Pain Rating 0/10 - no pain  -WK     Posttreatment Pain Rating 0/10 - no pain  -WK       Row Name 24 0949          Bed Mobility    Supine-Sit [facility] (Bed Mobility) standby assist  -WK       Row Name 24 0949          Sit-Stand Transfer    Sit-Stand [facility] (Transfers) standby assist  -WK       Row Name 24 0949          Stand-Sit Transfer    Stand-Sit [facility] (Transfers) standby assist  -WK       Row Name 24 0949           Gait/Stairs (Locomotion)    [place] Level (Gait) verbal cues;contact guard  -WK     Distance in Feet (Gait) 200  10 to BSC  -WK     Deviations/Abnormal Patterns (Gait) gait speed decreased  -WK     Comment, (Gait/Stairs) implusive but had increase safety today and increase in balance  -WK       Row Name             Wound 12/03/24 1743 Right scalp surgical    Wound - Properties Group Placement Date: 12/03/24  -KM Placement Time: 1743  -KM Side: Right  -KM Location: scalp  -KM Primary Wound Type: Incision  -KM Type: surgical  -KM Present on Original Admission: N  -KM    Retired Wound - Properties Group Placement Date: 12/03/24  -KM Placement Time: 1743  -KM Present on Original Admission: N  -KM Side: Right  -KM Location: scalp  -KM Primary Wound Type: Incision  -KM Type: surgical  -KM    Retired Wound - Properties Group Placement Date: 12/03/24  -KM Placement Time: 1743  -KM Present on Original Admission: N  -KM Side: Right  -KM Location: scalp  -KM Primary Wound Type: Incision  -KM Type: surgical  -KM    Retired Wound - Properties Group Date first assessed: 12/03/24  -KM Time first assessed: 1743  -KM Present on Original Admission: N  -KM Side: Right  -KM Location: scalp  -KM Primary Wound Type: Incision  -KM Type: surgical  -KM      Row Name 12/06/24 0949          Plan of Care Review    Plan of Care Reviewed With patient  -WK     Progress improving  -WK     Outcome Evaluation Bed mobility SBA and sit to stand SBA. Amb 200' CGA with no LOB and increase in safety awareness. Pt had no LOB during gait and educated on safety.  -WK       Row Name 12/06/24 0949          Positioning and Restraints    Pre-Treatment Position in bed  -WK     Post Treatment Position chair  -WK     In Chair reclined;call light within reach;encouraged to call for assist;with nsg  -WK               User Key  (r) = Recorded By, (t) = Taken By, (c) = Cosigned By      Initials Name Provider Type    WK Chasidy Onofre PTA Physical  Therapist Assistant    Brandy Ashford, RN Registered Nurse                    Physical Therapy Education       Title: PT OT SLP Therapies (In Progress)       Topic: Physical Therapy (In Progress)       Point: Mobility training (Done)       Learning Progress Summary            Patient Acceptance, SANDI GARCIALACIE by ANATOLIY at 12/4/2024 0926    Comment: benefits of activity, progression of PT                      Point: Home exercise program (Not Started)       Learner Progress:  Not documented in this visit.              Point: Body mechanics (Not Started)       Learner Progress:  Not documented in this visit.              Point: Precautions (Not Started)       Learner Progress:  Not documented in this visit.                              User Key       Initials Effective Dates Name Provider Type Discipline    ANATOLIY 02/03/23 -  Anton Haile, PT DPT Physical Therapist PT                  PT Recommendation and Plan     Plan of Care Reviewed With: patient  Progress: improving  Outcome Evaluation: Bed mobility SBA and sit to stand SBA. Amb 200' CGA with no LOB and increase in safety awareness. Pt had no LOB during gait and educated on safety.   Outcome Measures       Row Name 12/06/24 0949 12/05/24 0946          How much help from another person do you currently need...    Turning from your back to your side while in flat bed without using bedrails? 4  -WK 4  -MF     Moving from lying on back to sitting on the side of a flat bed without bedrails? 4  -WK 3  -MF     Moving to and from a bed to a chair (including a wheelchair)? 3  -WK 3  -MF     Standing up from a chair using your arms (e.g., wheelchair, bedside chair)? 4  -WK 3  -MF     Climbing 3-5 steps with a railing? 3  -WK 3  -MF     To walk in hospital room? 3  -WK 3  -MF     AM-PAC 6 Clicks Score (PT) 21  -WK 19  -MF        Functional Assessment    Outcome Measure Options AM-PAC 6 Clicks Basic Mobility (PT)  -WK AM-PAC 6 Clicks Basic Mobility (PT)  -MF               User  Key  (r) = Recorded By, (t) = Taken By, (c) = Cosigned By      Initials Name Provider Type    Mera Wharton PTA Physical Therapist Assistant    Chasidy Coelho PTA Physical Therapist Assistant                     Time Calculation:    PT Charges       Row Name 12/06/24 1015             Time Calculation    Start Time 0949  -WK      Stop Time 1013  -WK      Time Calculation (min) 24 min  -WK      PT Received On 12/06/24  -WK         Time Calculation- PT    Total Timed Code Minutes- PT 24 minute(s)  -WK         Timed Charges    21949 - Gait Training Minutes  24  -WK         Total Minutes    Timed Charges Total Minutes 24  -WK       Total Minutes 24  -WK                User Key  (r) = Recorded By, (t) = Taken By, (c) = Cosigned By      Initials Name Provider Type    WK Chasidy Onofre PTA Physical Therapist Assistant                  Therapy Charges for Today       Code Description Service Date Service Provider Modifiers Qty    [id number] HC GAIT TRAINING EA 15 MIN 12/6/2024 Chasidy Onofre PTA GP 2            PT G-Codes  Outcome Measure Options: AM-PAC 6 Clicks Basic Mobility (PT)  AM-PAC 6 Clicks Score (PT): 21  AM-PAC 6 Clicks Score (OT): 21    Chasidy nOofre PTA  12/6/2024

## 2024-12-06 NOTE — PROGRESS NOTES
Family Medicine Progress Note    Patient:  Rosa Kunz  YOB: 1940    MRN: [id number]     Acct: [id number]     Admit date: 12/3/2024    Patient Seen, Chart, Consults notes, Labs, Radiology studies reviewed.    Subjective: Day 3 of stay with subdural hematoma and most recent (in last 24 hours) has had drain removal without difficulty.  He is scheduled to be transferred to the floor yesterday however no beds available.  She states she is really doing well and has no lasting neurologic deficits.    Past, Family, Social History unchanged from admission.    Diet: Diet: Regular/House; Fluid Consistency: Thin (IDDSI 0)    Medications:  Scheduled Meds:Chlorhexidine Gluconate Cloth, 1 Application, Topical, Once  Chlorhexidine Gluconate Cloth, 1 Application, Topical, Q24H  enalapril, 20 mg, Oral, Daily  FLUoxetine, 20 mg, Oral, Daily  insulin lispro, 2-9 Units, Subcutaneous, 4x Daily AC & at Bedtime  levETIRAcetam, 500 mg, Oral, BID  metoprolol tartrate, 25 mg, Oral, Q12H  montelukast, 10 mg, Oral, Nightly  mupirocin, 1 Application, Each Nare, BID  senna-docusate sodium, 2 tablet, Oral, BID  sodium chloride, 10 mL, Intravenous, Q12H      Continuous Infusions:niCARdipine, 5-15 mg/hr, Last Rate: Stopped (12/03/24 2000)  sodium chloride, 75 mL/hr, Last Rate: 75 mL/hr (12/03/24 2007)      PRN Meds:  senna-docusate sodium **AND** polyethylene glycol **AND** bisacodyl **AND** bisacodyl    dextrose    dextrose    famotidine    glucagon (human recombinant)    labetalol    ondansetron    [Transfer Hold] sodium chloride    sodium chloride    sodium chloride    Objective:    Lab Results (last 24 hours)       Procedure Component Value Units Date/Time    POC Glucose Once [[id number]]  (Abnormal) Collected: 12/06/24 0754    Specimen: Blood Updated: 12/06/24 0805     Glucose 65 mg/dL      Comment: : [id number] Tim BaxterMeter ID: [id number]       Basic Metabolic Panel [[id number]]  (Abnormal) Collected: 12/06/24 0534     Specimen: Blood Updated: 12/06/24 0618     Glucose 94 mg/dL      BUN 11 mg/dL      Creatinine 0.52 mg/dL      Sodium 137 mmol/L      Potassium 3.6 mmol/L      Chloride 103 mmol/L      CO2 28.0 mmol/L      Calcium 8.3 mg/dL      BUN/Creatinine Ratio 21.2     Anion Gap 6.0 mmol/L      eGFR 91.7 mL/min/1.73     Narrative:      GFR Normal >60  Chronic Kidney Disease <60  Kidney Failure <15    The GFR formula is only valid for adults with stable renal function between ages 18 and 70.    CBC & Differential [[id number]]  (Abnormal) Collected: 12/06/24 0534    Specimen: Blood Updated: 12/06/24 0603    Narrative:      The following orders were created for panel order CBC & Differential.  Procedure                               Abnormality         Status                     ---------                               -----------         ------                     CBC Auto Differential[[id number]]        Abnormal            Final result                 Please view results for these tests on the individual orders.    CBC Auto Differential [[id number]]  (Abnormal) Collected: 12/06/24 0534    Specimen: Blood Updated: 12/06/24 0603     WBC 5.00 10*3/mm3      RBC 3.59 10*6/mm3      Hemoglobin 11.1 g/dL      Hematocrit 34.8 %      MCV 96.9 fL      MCH 30.9 pg      MCHC 31.9 g/dL      RDW 13.6 %      RDW-SD 48.4 fl      MPV 9.6 fL      Platelets 166 10*3/mm3      Neutrophil % 54.8 %      Lymphocyte % 33.4 %      Monocyte % 8.8 %      Eosinophil % 2.2 %      Basophil % 0.4 %      Immature Grans % 0.4 %      Neutrophils, Absolute 2.74 10*3/mm3      Lymphocytes, Absolute 1.67 10*3/mm3      Monocytes, Absolute 0.44 10*3/mm3      Eosinophils, Absolute 0.11 10*3/mm3      Basophils, Absolute 0.02 10*3/mm3      Immature Grans, Absolute 0.02 10*3/mm3      nRBC 0.0 /100 WBC     POC Glucose Once [[id number]]  (Normal) Collected: 12/05/24 2106    Specimen: Blood Updated: 12/05/24 2117     Glucose 104 mg/dL      Comment: : [id number] Gema  Nora ID: [id number]       POC Glucose Once [[id number]]  (Normal) Collected: 12/05/24 1634    Specimen: Blood Updated: 12/05/24 1645     Glucose 96 mg/dL      Comment: : [id number] Wilman Carrizales ID: [id number]       POC Glucose Once [[id number]]  (Normal) Collected: 12/05/24 1110    Specimen: Blood Updated: 12/05/24 1121     Glucose 112 mg/dL      Comment: : [id number] Wilman ChaconMeter ID: [id number]                Imaging Results (Last 72 Hours)       Procedure Component Value Units Date/Time    CT Head Without Contrast [[id number]] Collected: 12/04/24 0605     Updated: 12/04/24 0612    Narrative:      EXAMINATION: CT HEAD WO CONTRAST-      12/4/2024 3:51 AM     HISTORY: Status post surgery for subdural hematoma.     In order to have a CT radiation dose as low as reasonably achievable  Automated Exposure Control was utilized for adjustment of the mA and/or  KV according to patient size.     CT Dose DLP = 917.1 mGy.cm.  (If there are multiple studies performed at the same time this  represents the total dose).     Noncontrast head CT.  Axial, sagittal, and coronal sequences.     COMPARISON:  Yesterday at 3:03 p.m.     Status post RIGHT frontal craniotomy with subdural drain placement.  The drain is in good position.  The subdural collection is now almost completely hypodense and is  decreased in size with decreased mass effect and resolution of midline  shift.  No hydrocephalus.     The residual subdural collection measures approximately 51 x 35 x 10 mm  compared with 90 x 50 x 17 mm on the preoperative exam.     There is no new hemorrhage.       Impression:      1. RIGHT frontal craniotomy with subdural drain placement.  2. Decreased size of the mixed density RIGHT subdural hemorrhage and  resolution of midline shift.     This report was signed and finalized on 12/4/2024 6:09 AM by Dr. Jax Gallardo MD.       CT Head Without Contrast [[id number]] Collected: 12/03/24 1504     Updated: 12/03/24 1511     "Narrative:      EXAMINATION: CT HEAD WO CONTRAST-      12/3/2024 1:56 PM     HISTORY: Paresthesias left hand. Stroke symptoms.     In order to have a CT radiation dose as low as reasonably achievable  Automated Exposure Control was utilized for adjustment of the mA and/or  KV according to patient size.     CT Dose DLP = 671.29 mGy.cm.  (If there are multiple studies performed at the same time this  represents the total dose).     Noncontrast head CT.  Axial, sagittal, and coronal sequences.     COMPARISON:  10/19/2024.     Mixed density RIGHT subdural hemorrhage.  This collection is mostly low density though there is a thin streaky  hyperdense component.  This subdural collection is slightly atypical being biconvex in shape.  This collection measures approximately 9 cm x 5 cm x 1.7 cm. There is  very slight effacement of the RIGHT lateral ventricle and 2-3 mm of  midline shift.  No acute infarct is seen.  No parenchymal hemorrhage is seen.       Impression:      1. Right-sided extra-axial hemorrhage with mild mass effect.  2. This is a mostly low density collection though is new as compared  with the CT from 6 weeks ago.     This report was signed and finalized on 12/3/2024 3:08 PM by Dr. Jax Gallardo MD.                Physical Exam:    Vitals: /77   Pulse 67   Temp 97.7 °F (36.5 °C) (Axillary)   Resp 20   Ht 154.9 cm (61\")   Wt 80.3 kg (177 lb 0.5 oz)   SpO2 98%   BMI 33.45 kg/m²   24 hour intake/output:  Intake/Output Summary (Last 24 hours) at 12/6/2024 0813  Last data filed at 12/6/2024 0000  Gross per 24 hour   Intake 1588 ml   Output 200 ml   Net 1388 ml     Last 3 weights:  Wt Readings from Last 3 Encounters:   12/06/24 80.3 kg (177 lb 0.5 oz)   10/29/24 78.5 kg (173 lb)   10/19/24 78.9 kg (174 lb)       General Appearance alert, appears stated age, cooperative, and overweight  Head  status post craniotomy on the right  Eyes lids and lashes normal, conjunctivae and sclerae normal, and no " icterus  Neck supple and trachea midline  Lungs clear to auscultation, respirations regular, respirations even, and respirations unlabored  Heart regular rhythm & normal rate and normal S1, S2  Abdomen normal bowel sounds, no masses, and soft non-tender  Extremities no edema, no cyanosis, and no redness  Skin  surgical incision clean dry and intact  Neurologic Mental Status orientated to person, place, time and situation        Assessment:      SDH (subdural hematoma)    Essential hypertension    Gastroesophageal reflux disease without esophagitis    Mixed hyperlipidemia    Type 2 diabetes mellitus          Plan:  Plan was for her to be excepted my service yesterday once drain removed.  Unfortunately there is no bed available and remains in the intensive care.  I discussed with neurosurgery service and plan is for repeat CT scan today.  There is a distinct possibility if that is okay and she continues to do well and that she may be discharged home from the ICU and not require stepdown or floor bed given lack of availability.  If that is the case I will plan to see her back in the office for a transition of care visit within the next 1 to 2 weeks.  Appreciate the excellent care she has been receiving by intensivist service and neurosurgery.      Electronically signed by Jermaine Wolff MD on 12/6/2024 at 08:13 CST

## 2024-12-06 NOTE — PLAN OF CARE
Goal Outcome Evaluation:  Plan of Care Reviewed With: patient        Progress: improving  Outcome Evaluation: Bed mobility SBA and sit to stand SBA. Amb 200' CGA with no LOB and increase in safety awareness. Pt had no LOB during gait and educated on safety.

## 2024-12-06 NOTE — OUTREACH NOTE
Prep Survey      Flowsheet Row Responses   [facility] facility patient discharged from? [facility]   Is LACE score < 7 ? No   Eligibility Readm Mgmt   Discharge diagnosis SDH (subdural hematoma)   Does the patient have one of the following disease processes/diagnoses(primary or secondary)? Stroke   Does the patient have Home health ordered? Yes   What is the Home health agency?  [facility]   Is there a DME ordered? No   Prep survey completed? Yes            YENY MUJICA - Registered Nurse

## 2024-12-06 NOTE — CASE MANAGEMENT/SOCIAL WORK
Continued Stay Note  JOSÉ ANTONIO Desir     Patient Name: Rosa Kunz  MRN: [id number]  Today's Date: 12/6/2024    Admit Date: 12/3/2024    Plan: Home   Discharge Plan       Row Name 12/06/24 1148       Plan    Plan Home    Plan Comments Patient plans to return home upon discharge.  SW following for needs.                   Discharge Codes    No documentation.                       ABHISHEK Capellan

## 2024-12-06 NOTE — PROGRESS NOTES
"Progress Note    Patient:  Rosa Kunz  YOB: 1940  MRN: [id number]   Admit date: 12/3/2024   Admitting Physician: Daniel Sandhu*  Primary Care Physician: Jermaine Wolff MD    Chief Complaint: Subdural hematoma    Interval History: No events overnight.  Patient is doing well.  Does feel like the numbness has improved and her coordination is improving as well.    Intake/Output Summary (Last 24 hours) at 12/6/2024 0816  Last data filed at 12/6/2024 0000  Gross per 24 hour   Intake 1588 ml   Output 200 ml   Net 1388 ml     Allergies:   Allergies   Allergen Reactions    Sulfa Antibiotics Rash     Current Scheduled Medications:   Chlorhexidine Gluconate Cloth, 1 Application, Topical, Once  Chlorhexidine Gluconate Cloth, 1 Application, Topical, Q24H  enalapril, 20 mg, Oral, Daily  FLUoxetine, 20 mg, Oral, Daily  insulin lispro, 2-9 Units, Subcutaneous, 4x Daily AC & at Bedtime  levETIRAcetam, 500 mg, Oral, BID  metoprolol tartrate, 25 mg, Oral, Q12H  montelukast, 10 mg, Oral, Nightly  mupirocin, 1 Application, Each Nare, BID  senna-docusate sodium, 2 tablet, Oral, BID  sodium chloride, 10 mL, Intravenous, Q12H      Current PRN Medications:    senna-docusate sodium **AND** polyethylene glycol **AND** bisacodyl **AND** bisacodyl    dextrose    dextrose    famotidine    glucagon (human recombinant)    labetalol    ondansetron    [Transfer Hold] sodium chloride    sodium chloride    sodium chloride    Review of Systems   Constitutional:  Negative for fever.   Musculoskeletal: Negative.    Neurological:  Negative for numbness and headaches.       Pertinent positives/negatives documented in HPI.  All other systems reviewed and negative.    Vital Signs:  /77   Pulse 67   Temp 97.7 °F (36.5 °C) (Axillary)   Resp 20   Ht 154.9 cm (61\")   Wt 80.3 kg (177 lb 0.5 oz)   SpO2 98%   BMI 33.45 kg/m²     Physical Exam  Constitutional:       General: She is awake. Vital signs are normal.      " "Appearance: She is well-developed.   HENT:      Head: Normocephalic.   Eyes:      Extraocular Movements: Extraocular movements intact.      Pupils: Pupils are equal, round, and reactive to light.   Cardiovascular:      Rate and Rhythm: Normal rate and regular rhythm.      Pulses: Intact distal pulses.   Pulmonary:      Effort: Pulmonary effort is normal. No respiratory distress.   Abdominal:      General: Bowel sounds are normal. There is no distension.      Palpations: Abdomen is soft.   Musculoskeletal:         General: Normal range of motion.      Cervical back: Normal range of motion.   Skin:     General: Skin is warm and dry.   Neurological:      Mental Status: She is alert and oriented to person, place, and time.      GCS: GCS eye subscore is 4. GCS verbal subscore is 5. GCS motor subscore is 6.      Cranial Nerves: No cranial nerve deficit.      Sensory: No sensory deficit.      Motor: Motor strength is normal.     Gait: Gait normal.      Deep Tendon Reflexes: Reflexes are normal and symmetric.   Psychiatric:         Speech: Speech normal.         Behavior: Behavior normal.         Thought Content: Thought content normal.       Vital signs reviewed.  Line/IV site: No erythema, warmth, induration, or tenderness.    Objective:  General Appearance:  Comfortable, well-appearing, in no acute distress and not in pain.    Vital signs: (most recent): Blood pressure 155/77, pulse 67, temperature 97.7 °F (36.5 °C), temperature source Axillary, resp. rate 20, height 154.9 cm (61\"), weight 80.3 kg (177 lb 0.5 oz), SpO2 98%.  Vital signs are normal.  No fever.    Output: Producing urine.    HEENT: Normal HEENT exam.    Lungs:  Normal effort and normal respiratory rate.  She is not in respiratory distress.    Heart: Normal rate.  Regular rhythm.    Chest: Symmetric chest wall expansion.   Extremities: Normal range of motion.    Skin:  Warm and dry.    Abdomen: Abdomen is soft and non-distended.  Bowel sounds are normal.   " "There is no abdominal tenderness.     Pulses: Distal pulses are intact.        Neurological Exam  Mental Status  Awake and alert. Oriented to person, place and time. Oriented to person, place, and time. Speech is normal. Language is fluent with no aphasia. Attention and concentration are normal.    Cranial Nerves  CN I: Sense of smell is normal.  CN II: Right normal visual field. Left normal visual field.  CN III, IV, VI: Extraocular movements intact bilaterally. Pupils equal round and reactive to light bilaterally.  CN V: Facial sensation is normal.  CN VII:  Right: There is no facial weakness.  Left: There is no facial weakness.  CN XI: Shoulder shrug strength is normal.  CN XII: Tongue midline without atrophy or fasciculations.    Motor  Normal muscle bulk throughout. Normal muscle tone. Strength is 5/5 throughout all four extremities.    Sensory  Sensation is intact to light touch, pinprick, vibration and proprioception in all four extremities.    Reflexes  Deep tendon reflexes are 2+ and symmetric in all four extremities.    Gait   Normal gait.      Lab Results:  ABG:     CBC:   Results from last 7 days   Lab Units 12/06/24  0534 12/05/24  0231 12/04/24  0346 12/03/24  1330   WBC 10*3/mm3 5.00 5.31 4.83 7.73   HEMOGLOBIN g/dL 11.1* 10.7* 11.7* 13.8   HEMATOCRIT % 34.8 32.4* 35.4 43.3   PLATELETS 10*3/mm3 166 154 161 219     BMP:  Results from last 7 days   Lab Units 12/06/24  0534 12/05/24  0231 12/04/24  0346 12/03/24  1330   SODIUM mmol/L 137 138 134* 132*   POTASSIUM mmol/L 3.6 3.9 4.0 4.2   CHLORIDE mmol/L 103 104 98 91*   CO2 mmol/L 28.0 26.0 26.0 29.0   BUN mg/dL 11 13 10 9   CREATININE mg/dL 0.52* 0.52* 0.49* 0.62   GLUCOSE mg/dL 94 144* 174* 136*   CALCIUM mg/dL 8.3* 7.7* 8.0* 8.7   ALT (SGPT) U/L  --   --   --  8     Culture Results: No results found for: \"BLOODCX\", \"URINECX\", \"WOUNDCX\", \"MRSACX\", \"RESPCX\", \"STOOLCX\"          Impression/Recommendations:   CV: Heart rate and blood pressure stable  RESP: " Oxygen level stable  GI: Tolerating p.o.  : Adequate urine output  NEURO: Neuroexam stable  ID: None  Patient is doing well.  Okay to wash hair.  Will repeat CT scan of the head today.  If patient has a good day okay for discharge      SDH (subdural hematoma)    Essential hypertension    Gastroesophageal reflux disease without esophagitis    Mixed hyperlipidemia    Type 2 diabetes mellitus      Terell Mayfield, APRN

## 2024-12-06 NOTE — CASE MANAGEMENT/SOCIAL WORK
Continued Stay Note   Karlee     Patient Name: Rosa Kunz  MRN: [id number]  Today's Date: 12/6/2024    Admit Date: 12/3/2024    Plan: Home with    Discharge Plan       Row Name 12/06/24 6848       Plan    Plan Home with     Patient/Family in Agreement with Plan yes    Plan Comments Patient has orders for HH.  [facility] has accepted patient for admission.    Final Discharge Disposition Code 06 - home with home health care      Row Name 12/06/24 6960       Plan    Plan Home    Plan Comments Patient plans to return home upon discharge.   following for needs.                   Discharge Codes    No documentation.                 Expected Discharge Date and Time       Expected Discharge Date Expected Discharge Time    Dec 6, 2024               ABHISHEK Capellan

## 2024-12-07 NOTE — THERAPY DISCHARGE NOTE
Acute Care - Physical Therapy Discharge Summary  [facility]       Patient Name: Rosa Kunz  : 1940  MRN: [id number]    Today's Date: 2024                 Admit Date: 12/3/2024      PT Recommendation and Plan    Visit Dx:    ICD-10-CM ICD-9-CM   1. Subdural hemorrhage  I62.00 432.1   2. Impaired mobility [Z74.09]  Z74.09 799.89        Outcome Measures       Row Name 24 0949 24 0946          How much help from another person do you currently need...    Turning from your back to your side while in flat bed without using bedrails? 4  -WK 4  -MF     Moving from lying on back to sitting on the side of a flat bed without bedrails? 4  -WK 3  -MF     Moving to and from a bed to a chair (including a wheelchair)? 3  -WK 3  -MF     Standing up from a chair using your arms (e.g., wheelchair, bedside chair)? 4  -WK 3  -MF     Climbing 3-5 steps with a railing? 3  -WK 3  -MF     To walk in hospital room? 3  -WK 3  -MF     AM-PAC 6 Clicks Score (PT) 21  -WK 19  -MF        Functional Assessment    Outcome Measure Options AM-PAC 6 Clicks Basic Mobility (PT)  -WK AM-PAC 6 Clicks Basic Mobility (PT)  -MF               User Key  (r) = Recorded By, (t) = Taken By, (c) = Cosigned By      Initials Name Provider Type     Mera Dowd PTA Physical Therapist Assistant    WK Chasidy Onofre PTA Physical Therapist Assistant                         PT Rehab Goals       Row Name 24 1500             Bed Mobility Goal 1 (PT)    Activity/Assistive Device (Bed Mobility Goal 1, PT) sit to supine/supine to sit  -AB      [place] Level/Cues Needed (Bed Mobility Goal 1, PT) supervision required  -AB      Time Frame (Bed Mobility Goal 1, PT) long term goal (LTG);10 days  -AB      Progress/Outcomes (Bed Mobility Goal 1, PT) goal not met  -AB         Transfer Goal 1 (PT)    Activity/Assistive Device (Transfer Goal 1, PT) sit-to-stand/stand-to-sit;bed-to-chair/chair-to-bed  -AB      [place] Level/Cues  Needed (Transfer Goal 1, PT) supervision required  -AB      Time Frame (Transfer Goal 1, PT) long term goal (LTG);10 days  -AB      Progress/Outcome (Transfer Goal 1, PT) goal not met  -AB         Gait Training Goal 1 (PT)    Activity/Assistive Device (Gait Training Goal 1, PT) gait (walking locomotion);decrease fall risk;improve balance and speed;increase endurance/gait distance  -AB      [place] Level (Gait Training Goal 1, PT) supervision required  -AB      Distance (Gait Training Goal 1, PT) 200 ft  -AB      Time Frame (Gait Training Goal 1, PT) long term goal (LTG);10 days  -AB      Progress/Outcome (Gait Training Goal 1, PT) goal not met  -AB                User Key  (r) = Recorded By, (t) = Taken By, (c) = Cosigned By      Initials Name Provider Type Discipline    Radha Hackett, PTA Physical Therapist Assistant PT                        PT Discharge Summary  Anticipated Discharge Disposition (PT): home with assist  Reason for Discharge: Discharge from facility  Outcomes Achieved: Refer to plan of care for updates on goals achieved  Discharge Destination: Home with assist      Radha Mccann PTA   12/7/2024

## 2024-12-09 ENCOUNTER — HOME CARE VISIT (OUTPATIENT)
Dept: HOME HEALTH SERVICES | Facility: CLINIC | Age: 84
End: 2024-12-09
Payer: MEDICARE

## 2024-12-09 NOTE — Clinical Note
Visit was made to patient's home. Patient stated she didnt think she needed and HH services. Patient was not admitted.    Alert and oriented, well nourished, well developed 84 year old female S/P crainotomy for evacuation of hematoma after fall 6 weeks ago. Patient went to ER where CT was done with no active bleed until symptoms of left arm numbness occurred last Thursday. Patient stayed overnight after surgery and was dischrged home. Incision to right forehead was noted to be clean, dry and KIERSTEN. Patient is ambulatory by self with no complaints of pain, numbness or inability to move any extremities. She is alert and oriented with no neuro deficits. Patient states she is back at baseline. Medications reviewed, she is not on any anticoagulants or antiplatelets and is taking Keppra. Keppra is her only new medication, she instructed SN what it was for and when to take it. Patient states she has no need for skilled services at this time. Thank you for this referral, if any needs arise will be happy to evaluate again if needed.

## 2024-12-10 ENCOUNTER — READMISSION MANAGEMENT (OUTPATIENT)
Dept: CALL CENTER | Facility: HOSPITAL | Age: 84
End: 2024-12-10
Payer: MEDICARE

## 2024-12-10 NOTE — OUTREACH NOTE
Stroke Week 1 Survey      Flowsheet Row Responses   [facility] facility patient discharged from? [facility]   Does the patient have one of the following disease processes/diagnoses(primary or secondary)? Stroke   Week 1 attempt successful? No   Unsuccessful attempts Attempt 1   Discharge diagnosis SDH (subdural hematoma)            PATRICIA SCHILLING - Registered Nurse

## 2024-12-11 ENCOUNTER — TELEPHONE (OUTPATIENT)
Dept: NEUROSURGERY | Facility: CLINIC | Age: 84
End: 2024-12-11
Payer: MEDICARE

## 2024-12-11 NOTE — TELEPHONE ENCOUNTER
Pt called back made pt aware of CT scan date and time. Ended call with pt understanding and acknowledgment.

## 2024-12-26 ENCOUNTER — HOSPITAL ENCOUNTER (OUTPATIENT)
Dept: CT IMAGING | Facility: HOSPITAL | Age: 84
Discharge: HOME | End: 2024-12-26
Admitting: NURSE PRACTITIONER
Payer: MEDICARE

## 2024-12-26 ENCOUNTER — OFFICE VISIT (OUTPATIENT)
Dept: NEUROSURGERY | Facility: CLINIC | Age: 84
End: 2024-12-26
Payer: MEDICARE

## 2024-12-26 VITALS — WEIGHT: 171 LBS | HEIGHT: 61 IN | BODY MASS INDEX: 32.28 KG/M2

## 2024-12-26 DIAGNOSIS — I62.00 SUBDURAL HEMORRHAGE: ICD-10-CM

## 2024-12-26 DIAGNOSIS — S06.5XAA SDH (SUBDURAL HEMATOMA): Primary | ICD-10-CM

## 2024-12-26 PROCEDURE — 1160F RVW MEDS BY RX/DR IN RCRD: CPT | Performed by: NURSE PRACTITIONER

## 2024-12-26 PROCEDURE — 1159F MED LIST DOCD IN RCRD: CPT | Performed by: NURSE PRACTITIONER

## 2024-12-26 PROCEDURE — 70450 CT HEAD/BRAIN W/O DYE: CPT

## 2024-12-26 PROCEDURE — 99024 POSTOP FOLLOW-UP VISIT: CPT | Performed by: NURSE PRACTITIONER

## 2024-12-26 RX ORDER — ALPRAZOLAM 0.25 MG/1
0.25 TABLET ORAL 3 TIMES DAILY PRN
COMMUNITY

## 2024-12-26 RX ORDER — FUROSEMIDE 20 MG/1
1 TABLET ORAL AS NEEDED
COMMUNITY

## 2024-12-26 RX ORDER — DIPHENHYDRAMINE HCL 25 MG
TABLET ORAL
COMMUNITY
Start: 2024-10-21

## 2024-12-26 RX ORDER — CALCIUM CITRATE/VITAMIN D3 200MG-6.25
TABLET ORAL
COMMUNITY
Start: 2024-10-21

## 2024-12-26 RX ORDER — ZOLPIDEM TARTRATE 5 MG/1
1 TABLET ORAL NIGHTLY PRN
COMMUNITY
Start: 2024-10-09

## 2024-12-26 NOTE — PROGRESS NOTES
"  Chief complaint:   Chief Complaint   Patient presents with    SDH (subdural hematoma)     Pt is here for 3wk p/o f/u. Pt states since surgery her symptoms have improved. Pt walking with a cane today.          Subjective     HPI: This is a 84 y.o. female patient who went to the operating room on 12/3/2024 for a [place] - Right. The patient is here in follow up today for postoperative visit.  The patient says that she does like she is doing well today.  She is off of the seizure medicine.  She is not complaining of any further numbness.  She is walking with a cane.  She was able to drive herself here today.  Denies any memory issues.  Denies any nausea or vomiting.  She is tolerating p.o.        Review of Systems   Musculoskeletal:  Positive for gait problem.   Neurological:  Negative for numbness.         Objective      Vital Signs  Ht 154.9 cm (61\")   Wt 77.6 kg (171 lb)   BMI 32.31 kg/m²     Physical Exam  Constitutional:       General: She is awake.      Appearance: She is well-developed.   HENT:      Head: Normocephalic.   Eyes:      Extraocular Movements: Extraocular movements intact.      Pupils: Pupils are equal, round, and reactive to light.   Pulmonary:      Effort: Pulmonary effort is normal.   Musculoskeletal:         General: Normal range of motion.      Cervical back: Normal range of motion.   Skin:     General: Skin is warm.   Neurological:      Mental Status: She is alert and oriented to person, place, and time.      GCS: GCS eye subscore is 4. GCS verbal subscore is 5. GCS motor subscore is 6.      Cranial Nerves: No cranial nerve deficit.      Sensory: No sensory deficit.      Motor: Motor strength is normal.     Gait: Gait abnormal.      Deep Tendon Reflexes: Reflexes are normal and symmetric.      Comments: Walking independently with a cane   Psychiatric:         Speech: Speech normal.         Behavior: Behavior normal.         Thought Content: Thought content normal.       Incisions clean " dry and intact    Neurological Exam  Mental Status  Awake and alert. Oriented to person, place and time. Oriented to person, place, and time. Speech is normal. Language is fluent with no aphasia. Attention and concentration are normal.    Cranial Nerves  CN I: Sense of smell is normal.  CN II: Right normal visual field. Left normal visual field.  CN III, IV, VI: Extraocular movements intact bilaterally. Pupils equal round and reactive to light bilaterally.  CN V: Facial sensation is normal.  CN VII:  Right: There is no facial weakness.  Left: There is no facial weakness.  CN XI: Shoulder shrug strength is normal.  CN XII: Tongue midline without atrophy or fasciculations.    Motor  Normal muscle bulk throughout. Normal muscle tone. Strength is 5/5 throughout all four extremities.    Sensory  Sensation is intact to light touch, pinprick, vibration and proprioception in all four extremities.    Reflexes  Deep tendon reflexes are 2+ and symmetric in all four extremities.    Gait   Abnormal gait.      Imaging review: CT scan of the head that was done on December 26, 2024 shows near complete resolution of the subdural hematoma on the right.  No mass effect or midline shift.          Assessment/Plan: Patient doing well from a surgery standpoint.  Will have her follow-up with Dr. Bailey in 8 to 10 weeks.  She was told to call us if any further problems or concerns.    Patient is a nonsmoker  The patient's Body mass index is 32.31 kg/m².. BMI is above normal parameters. Recommendations include: educational material and nutrition counseling    Diagnoses and all orders for this visit:    1. SDH (subdural hematoma) (Primary)        I discussed the patients findings and my recommendations with patient  Terell Mayfield, APRN  12/26/24  09:57 CST

## 2024-12-26 NOTE — PATIENT INSTRUCTIONS
"BMI for Adults  What is BMI?  Body mass index (BMI) is a number that is calculated from a person's weight and height. BMI can help estimate how much of a person's weight is composed of fat. BMI does not measure body fat directly. Rather, it is an alternative to procedures that directly measure body fat, which can be difficult and expensive.  BMI can help identify people who may be at higher risk for certain medical problems.  What are BMI measurements used for?  BMI is used as a screening tool to identify possible weight problems. It helps determine whether a person is obese, overweight, a healthy weight, or underweight.  BMI is useful for:  Identifying a weight problem that may be related to a medical condition or may increase the risk for medical problems.  Promoting changes, such as changes in diet and exercise, to help reach a healthy weight. BMI screening can be repeated to see if these changes are working.  How is BMI calculated?  BMI involves measuring your weight in relation to your height. Both height and weight are measured, and the BMI is calculated from those numbers. This can be done either in English (U[place] or metric measurements. Note that charts and online BMI calculators are available to help you find your BMI quickly and easily without having to do these calculations yourself.  To calculate your BMI in English (U.S[place] measurements:    Measure your weight in pounds (lb).  Multiply the number of pounds by 703.  For example, for a person who weighs 180 lb, multiply that number by 703, which equals 126,540.  Measure your height in inches. Then multiply that number by itself to get a measurement called \"inches squared.\"  For example, for a person who is 70 inches tall, the \"inches squared\" measurement is 70 inches x 70 inches, which equals 4,900 inches squared.  Divide the total from step 2 (number of lb x 703) by the total from step 3 (inches squared): 126,540 ÷ 4,900 = 25.8. This is your BMI.    To " "calculate your BMI in metric measurements:  Measure your weight in kilograms (kg).  Measure your height in meters (m). Then multiply that number by itself to get a measurement called \"meters squared.\"  For example, for a person who is 1.75 m tall, the \"meters squared\" measurement is 1.75 m x 1.75 m, which is equal to 3.1 meters squared.  Divide the number of kilograms (your weight) by the meters squared number. In this example: 70 ÷ 3.1 = 22.6. This is your BMI.  What do the results mean?  BMI charts are used to identify whether you are underweight, normal weight, overweight, or obese. The following guidelines will be used:  Underweight: BMI less than 18.5.  Normal weight: BMI between 18.5 and 24.9.  Overweight: BMI between 25 and 29.9.  Obese: BMI of 30 or above.  Keep these notes in mind:  Weight includes both fat and muscle, so someone with a muscular build, such as an athlete, may have a BMI that is higher than 24.9. In cases like these, BMI is not an accurate measure of body fat.  To determine if excess body fat is the cause of a BMI of 25 or higher, further assessments may need to be done by a health care provider.  BMI is usually interpreted in the same way for men and women.  Where to find more information  For more information about BMI, including tools to quickly calculate your BMI, go to these websites:  Centers for Disease Control and Prevention: www.cdc.gov  American Heart Association: www.heart.org  National Heart, Lung, and Blood [place]: www.nhlbi.nih.gov  Summary  Body mass index (BMI) is a number that is calculated from a person's weight and height.  BMI may help estimate how much of a person's weight is composed of fat. BMI can help identify those who may be at higher risk for certain medical problems.  BMI can be measured using English measurements or metric measurements.  BMI charts are used to identify whether you are underweight, normal weight, overweight, or obese.  This information is not " "intended to replace advice given to you by your health care provider. Make sure you discuss any questions you have with your health care provider.  Document Revised: 09/09/2020 Document Reviewed: 07/17/2020  [employer] Patient Education © 2021 [employer] https://www.nhlbi.nih.gov/files/docs/public/heart/dash_brief.pdf\">   DASH Eating Plan  DASH stands for Dietary Approaches to Stop Hypertension. The DASH eating plan is a healthy eating plan that has been shown to:  Reduce high blood pressure (hypertension).  Reduce your risk for type 2 diabetes, heart disease, and stroke.  Help with weight loss.  What are tips for following this plan?  Reading food labels  Check food labels for the amount of salt (sodium) per serving. Choose foods with less than 5 percent of the Daily Value of sodium. Generally, foods with less than 300 milligrams (mg) of sodium per serving fit into this eating plan.  To find whole grains, look for the word \"whole\" as the first word in the ingredient list.  Shopping  Buy products labeled as \"low-sodium\" or \"no salt added.\"  Buy fresh foods. Avoid canned foods and pre-made or frozen meals.  Cooking  Avoid adding salt when cooking. Use salt-free seasonings or herbs instead of table salt or sea salt. Check with your health care provider or pharmacist before using salt substitutes.  Do not caro foods. Cook foods using healthy methods such as baking, boiling, grilling, roasting, and broiling instead.  Cook with heart-healthy oils, such as olive, canola, avocado, soybean, or sunflower oil.  Meal planning    Eat a balanced diet that includes:  4 or more servings of fruits and 4 or more servings of vegetables each day. Try to fill one-half of your plate with fruits and vegetables.  6-8 servings of whole grains each day.  Less than 6 oz (170 g) of lean meat, poultry, or fish each day. A 3-oz (85-g) serving of meat is about the same size as a deck of cards. One egg equals 1 oz (28 g).  2-3 servings of low-fat " dairy each day. One serving is 1 cup (237 mL).  1 serving of nuts, seeds, or beans 5 times each week.  2-3 servings of heart-healthy fats. Healthy fats called omega-3 fatty acids are found in foods such as walnuts, flaxseeds, fortified milks, and eggs. These fats are also found in cold-water fish, such as sardines, salmon, and mackerel.  Limit how much you eat of:  Canned or prepackaged foods.  Food that is high in trans fat, such as some fried foods.  Food that is high in saturated fat, such as fatty meat.  Desserts and other sweets, sugary drinks, and other foods with added sugar.  Full-fat dairy products.  Do not salt foods before eating.  Do not eat more than 4 egg yolks a week.  Try to eat at least 2 vegetarian meals a week.  Eat more home-cooked food and less restaurant, buffet, and fast food.    Lifestyle  When eating at a restaurant, ask that your food be prepared with less salt or no salt, if possible.  If you drink alcohol:  Limit how much you use to:  0-1 drink a day for women who are not pregnant.  0-2 drinks a day for men.  Be aware of how much alcohol is in your drink. In the [place] one drink equals one 12 oz bottle of beer (355 mL), one 5 oz glass of wine (148 mL), or one 1½ oz glass of hard liquor (44 mL).  General information  Avoid eating more than 2,300 mg of salt a day. If you have hypertension, you may need to reduce your sodium intake to 1,500 mg a day.  Work with your health care provider to maintain a healthy body weight or to lose weight. Ask what an ideal weight is for you.  Get at least 30 minutes of exercise that causes your heart to beat faster (aerobic exercise) most days of the week. Activities may include walking, swimming, or biking.  Work with your health care provider or dietitian to adjust your eating plan to your individual calorie needs.  What foods should I eat?  Fruits  All fresh, dried, or frozen fruit. Canned fruit in natural juice (without added sugar).  Vegetables  Fresh or  frozen vegetables (raw, steamed, roasted, or grilled). Low-sodium or reduced-sodium tomato and vegetable juice. Low-sodium or reduced-sodium tomato sauce and tomato paste. Low-sodium or reduced-sodium canned vegetables.  Grains  Whole-grain or whole-wheat bread. Whole-grain or whole-wheat pasta. Brown rice. Oatmeal. Quinoa. Bulgur. Whole-grain and low-sodium cereals. Sabrina bread. Low-fat, low-sodium crackers. Whole-wheat flour tortillas.  Meats and other proteins  Skinless chicken or turkey. Ground chicken or turkey. Pork with fat trimmed off. Fish and seafood. Egg whites. Dried beans, peas, or lentils. Unsalted nuts, nut butters, and seeds. Unsalted canned beans. Lean cuts of beef with fat trimmed off. Low-sodium, lean precooked or cured meat, such as sausages or meat loaves.  Dairy  Low-fat (1%) or fat-free (skim) milk. Reduced-fat, low-fat, or fat-free cheeses. Nonfat, low-sodium ricotta or cottage cheese. Low-fat or nonfat yogurt. Low-fat, low-sodium cheese.  Fats and oils  Soft margarine without trans fats. Vegetable oil. Reduced-fat, low-fat, or light mayonnaise and salad dressings (reduced-sodium). Canola, safflower, olive, avocado, soybean, and sunflower oils. Avocado.  Seasonings and condiments  Herbs. Spices. Seasoning mixes without salt.  Other foods  Unsalted popcorn and pretzels. Fat-free sweets.  The items listed above may not be a complete list of foods and beverages you can eat. Contact a dietitian for more information.  What foods should I avoid?  Fruits  Canned fruit in a light or heavy syrup. Fried fruit. Fruit in cream or butter sauce.  Vegetables  Creamed or fried vegetables. Vegetables in a cheese sauce. Regular canned vegetables (not low-sodium or reduced-sodium). Regular canned tomato sauce and paste (not low-sodium or reduced-sodium). Regular tomato and vegetable juice (not low-sodium or reduced-sodium). Pickles. Olives.  Grains  Baked goods made with fat, such as croissants, muffins, or some  breads. Dry pasta or rice meal packs.  Meats and other proteins  Fatty cuts of meat. Ribs. Fried meat. Aguilar. Bologna, salami, and other precooked or cured meats, such as sausages or meat loaves. Fat from the back of a pig (fatback). Bratwurst. Salted nuts and seeds. Canned beans with added salt. Canned or smoked fish. Whole eggs or egg yolks. Chicken or turkey with skin.  Dairy  Whole or 2% milk, cream, and half-and-half. Whole or full-fat cream cheese. Whole-fat or sweetened yogurt. Full-fat cheese. Nondairy creamers. Whipped toppings. Processed cheese and cheese spreads.  Fats and oils  Butter. Stick margarine. Lard. Shortening. Ghee. Aguilar fat. Tropical oils, such as coconut, palm kernel, or palm oil.  Seasonings and condiments  Onion salt, garlic salt, seasoned salt, table salt, and sea salt. Worcestershire sauce. Tartar sauce. Barbecue sauce. Teriyaki sauce. Soy sauce, including reduced-sodium. Steak sauce. Canned and packaged gravies. Fish sauce. Oyster sauce. Cocktail sauce. Store-bought horseradish. Ketchup. Mustard. Meat flavorings and tenderizers. Bouillon cubes. Hot sauces. Pre-made or packaged marinades. Pre-made or packaged taco seasonings. Relishes. Regular salad dressings.  Other foods  Salted popcorn and pretzels.  The items listed above may not be a complete list of foods and beverages you should avoid. Contact a dietitian for more information.  Where to find more information  National Heart, Lung, and Blood [place]: www.nhlbi.nih.gov  American Heart Association: www.heart.org  Academy of Nutrition and Dietetics: www.eatright.org  National Kidney Foundation: www.kidney.org  Summary  The DASH eating plan is a healthy eating plan that has been shown to reduce high blood pressure (hypertension). It may also reduce your risk for type 2 diabetes, heart disease, and stroke.  When on the DASH eating plan, aim to eat more fresh fruits and vegetables, whole grains, lean proteins, low-fat dairy, and  heart-healthy fats.  With the DASH eating plan, you should limit salt (sodium) intake to 2,300 mg a day. If you have hypertension, you may need to reduce your sodium intake to 1,500 mg a day.  Work with your health care provider or dietitian to adjust your eating plan to your individual calorie needs.  This information is not intended to replace advice given to you by your health care provider. Make sure you discuss any questions you have with your health care provider.  Document Revised: 11/20/2020 Document Reviewed: 11/20/2020  [employer] Patient Education © 2021 [employer] High-Protein and High-Calorie Diet  Eating high-protein and high-calorie foods can help you to gain weight, heal after an injury, and recover after an illness or surgery. The specific amount of daily protein and calories you need depends on:  Your body weight.  The reason this diet is recommended for you.  What is my plan?  Generally, a high-protein, high-calorie diet involves:  Eating 250-500 extra calories each day.  Making sure that you get enough of your daily calories from protein. Ask your health care provider how many of your calories should come from protein.  Talk with a health care provider, such as a diet and nutrition specialist (dietitian), about how much protein and how many calories you need each day. Follow the diet as directed by your health care provider.  What are tips for following this plan?    Preparing meals  Add whole milk, half-and-half, or heavy cream to cereal, pudding, soup, or hot cocoa.  Add whole milk to instant breakfast drinks.  Add peanut butter to oatmeal or smoothies.  Add powdered milk to baked goods, smoothies, or milkshakes.  Add powdered milk, cream, or butter to mashed potatoes.  Add cheese to cooked vegetables.  Make whole-milk yogurt parfaits. Top them with granola, fruit, or nuts.  Add cottage cheese to your fruit.  Add avocado, cheese, or both to sandwiches or salads.  Add meat, poultry, or seafood  to rice, pasta, casseroles, salads, and soups.  Use mayonnaise when making egg salad, chicken salad, or tuna salad.  Use peanut butter as a dip for vegetables or as a topping for pretzels, celery, or crackers.  Add beans to casseroles, dips, and spreads.  Add pureed beans to sauces and soups.  Replace calorie-free drinks with calorie-containing drinks, such as milk and fruit juice.  Replace water with milk or heavy cream when making foods such as oatmeal, pudding, or cocoa.  General instructions  Ask your health care provider if you should take a nutritional supplement.  Try to eat six small meals each day instead of three large meals.  Eat a balanced diet. In each meal, include one food that is high in protein.  Keep nutritious snacks available, such as nuts, trail mixes, dried fruit, and yogurt.  If you have kidney disease or diabetes, talk with your health care provider about how much protein is safe for you. Too much protein may put extra stress on your kidneys.  Drink your calories. Choose high-calorie drinks and have them after your meals.  What high-protein foods should I eat?    Vegetables  Soybeans. Peas.  Grains  Quinoa. Bulgur wheat.  Meats and other proteins  Beef, pork, and poultry. Fish and seafood. Eggs. Tofu. Textured vegetable protein (TVP). Peanut butter. Nuts and seeds. Dried beans. Protein powders.  Dairy  Whole milk. Whole-milk yogurt. Powdered milk. Cheese. Cottage Cheese. Eggnog.  Beverages  High-protein supplement drinks. Soy milk.  Other foods  Protein bars.  The items listed above may not be a complete list of high-protein foods and beverages. Contact a dietitian for more options.  What high-calorie foods should I eat?  Fruits  Dried fruit. Fruit leather. Canned fruit in syrup. Fruit juice. Avocado.  Vegetables  Vegetables cooked in oil or butter. Fried potatoes.  Grains  Pasta. Quick breads. Muffins. Pancakes. Ready-to-eat cereal.  Meats and other proteins  Peanut butter. Nuts and  seeds.  Dairy  Heavy cream. Whipped cream. Cream cheese. Sour cream. Ice cream. Custard. Pudding.  Beverages  Meal-replacement beverages. Nutrition shakes. Fruit juice. Sugar-sweetened soft drinks.  Seasonings and condiments  Salad dressing. Mayonnaise. Espinoza sauce. Fruit preserves or jelly. Honey. Syrup.  Sweets and desserts  Cake. Cookies. Pie. Pastries. Candy bars. Chocolate.  Fats and oils  Butter or margarine. Oil. Gravy.  Other foods  Meal-replacement bars.  The items listed above may not be a complete list of high-calorie foods and beverages. Contact a dietitian for more options.  Summary  A high-protein, high-calorie diet can help you gain weight or heal faster after an injury, illness, or surgery.  To increase your protein and calories, add ingredients such as whole milk, peanut butter, cheese, beans, meat, or seafood to meal items.  To get enough extra calories each day, include high-calorie foods and beverages at each meal.  Adding a high-calorie drink or shake can be an easy way to help you get enough calories each day. Talk with your healthcare provider or dietitian about the best options for you.  This information is not intended to replace advice given to you by your health care provider. Make sure you discuss any questions you have with your health care provider.  Document Revised: 11/30/2018 Document Reviewed: 10/30/2018  [employer] Patient Education © 2021 [employer]   Advance Care Planning and Advance Directives     You make decisions on a daily basis - decisions about where you want to live, your career, your home, your life. Perhaps one of the most important decisions you face is your choice for future medical care. Take time to talk with your family and your healthcare team and start planning today.  Advance Care Planning is a process that can help you:  Understand possible future healthcare decisions in light of your own experiences  Reflect on those decision in light of your goals and  values  Discuss your decisions with those closest to you and the healthcare professionals that care for you  Make a plan by creating a document that reflects your wishes    Surrogate Decision Maker  In the event of a medical emergency, which has left you unable to communicate or to make your own decisions, you would need someone to make decisions for you.  It is important to discuss your preferences for medical treatment with this person while you are in good health.     Qualities of a surrogate decision maker:  Willing to take on this role and responsibility  Knows what you want for future medical care  Willing to follow your wishes even if they don't agree with them  Able to make difficult medical decisions under stressful circumstances    Advance Directives  These are legal documents you can create that will guide your healthcare team and decision maker(s) when needed. These documents can be stored in the electronic medical record.    Living Will - a legal document to guide your care if you have a terminal condition or a serious illness and are unable to communicate. States vary by statute in document names/types, but most forms may include one or more of the following:        -  Directions regarding life-prolonging treatments        -  Directions regarding artificially provided nutrition/hydration        -  Choosing a healthcare decision maker        -  Direction regarding organ/tissue donation    Durable Power of  for Healthcare - this document names an -in-fact to make medical decisions for you, but it may also allow this person to make personal and financial decisions for you. Please seek the advice of an  if you need this type of document.    **Advance Directives are not required and no one may discriminate against you if you do not sign one.    Medical Orders  Many states allow specific forms/orders signed by your physician to record your wishes for medical treatment in your current  [facility]. This form, signed in personal communication with your physician, addresses resuscitation and other medical interventions that you may or may not want.      For more information or to schedule a time with a [facility] Advance Care Planning Facilitator contact: [facility] or call [phone] and someone will contact you directly.

## 2025-01-03 ENCOUNTER — READMISSION MANAGEMENT (OUTPATIENT)
Dept: CALL CENTER | Facility: HOSPITAL | Age: 85
End: 2025-01-03
Payer: MEDICARE

## 2025-01-16 ENCOUNTER — TRANSCRIBE ORDERS (OUTPATIENT)
Dept: ADMINISTRATIVE | Facility: HOSPITAL | Age: 85
End: 2025-01-16
Payer: MEDICARE

## 2025-01-16 DIAGNOSIS — S20.211A CONTUSION OF RIGHT FRONT WALL OF THORAX, INITIAL ENCOUNTER: Primary | ICD-10-CM

## 2025-03-04 ENCOUNTER — OFFICE VISIT (OUTPATIENT)
Dept: NEUROSURGERY | Facility: CLINIC | Age: 85
End: 2025-03-04
Payer: MEDICARE

## 2025-03-04 VITALS — HEIGHT: 61 IN | BODY MASS INDEX: 31.91 KG/M2 | WEIGHT: 169 LBS

## 2025-03-04 DIAGNOSIS — S06.5XAA SDH (SUBDURAL HEMATOMA): Primary | ICD-10-CM

## 2025-03-04 DIAGNOSIS — E66.811 CLASS 1 OBESITY DUE TO EXCESS CALORIES WITH SERIOUS COMORBIDITY AND BODY MASS INDEX (BMI) OF 31.0 TO 31.9 IN ADULT: ICD-10-CM

## 2025-03-04 DIAGNOSIS — E66.09 CLASS 1 OBESITY DUE TO EXCESS CALORIES WITH SERIOUS COMORBIDITY AND BODY MASS INDEX (BMI) OF 31.0 TO 31.9 IN ADULT: ICD-10-CM

## 2025-03-04 DIAGNOSIS — Z87.891 FORMER SMOKER: ICD-10-CM

## 2025-03-04 NOTE — PATIENT INSTRUCTIONS
"PATIENT TO CONTINUE TO FOLLOW UP WITH HER PRIMARY CARE PROVIDER FOR YEARLY PHYSICAL EXAMS TO ENSURE COMPLETE HEALTH MAINTENANCE      BMI for Adults  Body mass index (BMI) is a number found using a person's weight and height. BMI can help tell how much of a person's weight is made up of fat. BMI does not measure body fat directly. It is used instead of tests that directly measure body fat, which can be difficult and expensive.  What are BMI measurements used for?  BMI is useful to:  Find out if your weight puts you at higher risk for medical problems.  Help recommend changes, such as in diet and exercise. This can help you reach a healthy weight. BMI screening can be done again to see if these changes are working.  How is BMI calculated?  Your height and weight are measured. The BMI is found from those numbers. This can be done with U.S[place] or metric measurements. Note that charts and online BMI calculators are available to help you find your BMI quickly and easily without doing these calculations.  To calculate your BMI in U.S[place] measurements:  Measure your weight in pounds (lb).  Multiply the number of pounds by 703.  So, for an adult who weighs 150 lb, multiply that number by 703: 150 x 703, which equals 105,450.  Measure your height in inches. Then multiply that number by itself to get a measurement called \"inches squared.\"  So, for an adult who is 70 inches tall, the \"inches squared\" measurement is 70 inches x 70 inches, which equals 4,900 inches squared.  Divide the total from step 2 (number of lb x 703) by the total from step 3 (inches squared): 105,450 ÷ 4,900 = 21.5. This is your BMI.  To calculate your BMI in metric measurements:    Measure your weight in kilograms (kg).  For this example, the weight is 70 kg.  Measure your height in meters (m). Then multiply that number by itself to get a measurement called \"meters squared.\"  So, for an adult who is 1.75 m tall, the \"meters squared\" measurement is 1.75 m x 1.75 " m, which equals 3.1 meters squared.  Divide the number of kilograms (your weight) by the meters squared number. In this example: 70 ÷ 3.1 = 22.6. This is your BMI.  What do the results mean?  BMI charts are used to see if you are underweight, normal weight, overweight, or obese. The following guidelines will be used:  Underweight: BMI less than 18.5.  Normal weight: BMI between 18.5 and 24.9.  Overweight: BMI between 25 and 29.9.  Obese: BMI of 30 or above.  BMI is a tool and cannot diagnose a condition. Talk with your health care provider about what your BMI means for you. Keep these notes in mind:  Weight includes fat and muscle. Someone with a muscular build, such as an athlete, may have a BMI that is higher than 24.9. In cases like these, BMI is not a correct measure of body fat.  If you have a BMI of 25 or higher, your provider may need to do more testing to find out if excess body fat is the cause.  BMI is measured the same way for males and females. Females usually have more body fat than males of the same height and weight.  Where to find more information  For more information about BMI, including tools to quickly find your BMI, go to:  Centers for Disease Control and Prevention: cdc.gov  American Heart Association: heart.org  National Heart, Lung, and Blood [place]: nhlbi.nih.gov  This information is not intended to replace advice given to you by your health care provider. Make sure you discuss any questions you have with your health care provider.  Document Revised: 09/07/2023 Document Reviewed: 08/31/2023  [employer] Patient Education © 2024 [employer]DASH Eating Plan  DASH stands for Dietary Approaches to Stop Hypertension. The DASH eating plan is a healthy eating plan that has been shown to:  Lower high blood pressure (hypertension).  Reduce your risk for type 2 diabetes, heart disease, and stroke.  Help with weight loss.  What are tips for following this plan?  Reading food labels  Check food labels  "for the amount of salt (sodium) per serving. Choose foods with less than 5 percent of the Daily Value (DV) of sodium. In general, foods with less than 300 milligrams (mg) of sodium per serving fit into this eating plan.  To find whole grains, look for the word \"whole\" as the first word in the ingredient list.  Shopping  Buy products labeled as \"low-sodium\" or \"no salt added.\"  Buy fresh foods. Avoid canned foods and pre-made or frozen meals.  Cooking  Try not to add salt when you cook. Use salt-free seasonings or herbs instead of table salt or sea salt. Check with your health care provider or pharmacist before using salt substitutes.  Do not caro foods. Cook foods in healthy ways, such as baking, boiling, grilling, roasting, or broiling.  Cook using oils that are good for your heart. These include olive, canola, avocado, soybean, and sunflower oil.  Meal planning    Eat a balanced diet. This should include:  4 or more servings of fruits and 4 or more servings of vegetables each day. Try to fill half of your plate with fruits and vegetables.  6-8 servings of whole grains each day.  6 or less servings of lean meat, poultry, or fish each day. 1 oz is 1 serving. A 3 oz (85 g) serving of meat is about the same size as the palm of your hand. One egg is 1 oz (28 g).  2-3 servings of low-fat dairy each day. One serving is 1 cup (237 mL).  1 serving of nuts, seeds, or beans 5 times each week.  2-3 servings of heart-healthy fats. Healthy fats called omega-3 fatty acids are found in foods such as walnuts, flaxseeds, fortified milks, and eggs. These fats are also found in cold-water fish, such as sardines, salmon, and mackerel.  Limit how much you eat of:  Canned or prepackaged foods.  Food that is high in trans fat, such as fried foods.  Food that is high in saturated fat, such as fatty meat.  Desserts and other sweets, sugary drinks, and other foods with added sugar.  Full-fat dairy products.  Do not salt foods before " eating.  Do not eat more than 4 egg yolks a week.  Try to eat at least 2 vegetarian meals a week.  Eat more home-cooked food and less restaurant, buffet, and fast food.  Lifestyle  When eating at a restaurant, ask if your food can be made with less salt or no salt.  If you drink alcohol:  Limit how much you have to:  0-1 drink a day if you are female.  0-2 drinks a day if you are male.  Know how much alcohol is in your drink. In the [place] one drink is one 12 oz bottle of beer (355 mL), one 5 oz glass of wine (148 mL), or one 1½ oz glass of hard liquor (44 mL).  General information  Avoid eating more than 2,300 mg of salt a day. If you have hypertension, you may need to reduce your sodium intake to 1,500 mg a day.  Work with your provider to stay at a healthy body weight or lose weight. Ask what the best weight range is for you.  On most days of the week, get at least 30 minutes of exercise that causes your heart to beat faster. This may include walking, swimming, or biking.  Work with your provider or dietitian to adjust your eating plan to meet your specific calorie needs.  What foods should I eat?  Fruits  All fresh, dried, or frozen fruit. Canned fruits that are in their natural juice and do not have sugar added to them.  Vegetables  Fresh or frozen vegetables that are raw, steamed, roasted, or grilled. Low-sodium or reduced-sodium tomato and vegetable juice. Low-sodium or reduced-sodium tomato sauce and tomato paste. Low-sodium or reduced-sodium canned vegetables.  Grains  Whole-grain or whole-wheat bread. Whole-grain or whole-wheat pasta. Brown rice. Oatmeal. Quinoa. Bulgur. Whole-grain and low-sodium cereals. Sabrina bread. Low-fat, low-sodium crackers. Whole-wheat flour tortillas.  Meats and other proteins  Skinless chicken or turkey. Ground chicken or turkey. Pork with fat trimmed off. Fish and seafood. Egg whites. Dried beans, peas, or lentils. Unsalted nuts, nut butters, and seeds. Unsalted canned beans. Lean  cuts of beef with fat trimmed off. Low-sodium, lean precooked or cured meat, such as sausages or meat loaves.  Dairy  Low-fat (1%) or fat-free (skim) milk. Reduced-fat, low-fat, or fat-free cheeses. Nonfat, low-sodium ricotta or cottage cheese. Low-fat or nonfat yogurt. Low-fat, low-sodium cheese.  Fats and oils  Soft margarine without trans fats. Vegetable oil. Reduced-fat, low-fat, or light mayonnaise and salad dressings (reduced-sodium). Canola, safflower, olive, avocado, soybean, and sunflower oils. Avocado.  Seasonings and condiments  Herbs. Spices. Seasoning mixes without salt.  Other foods  Unsalted popcorn and pretzels. Fat-free sweets.  The items listed above may not be all the foods and drinks you can have. Talk to a dietitian to learn more.  What foods should I avoid?  Fruits  Canned fruit in a light or heavy syrup. Fried fruit. Fruit in cream or butter sauce.  Vegetables  Creamed or fried vegetables. Vegetables in a cheese sauce. Regular canned vegetables that are not marked as low-sodium or reduced-sodium. Regular canned tomato sauce and paste that are not marked as low-sodium or reduced-sodium. Regular tomato and vegetable juices that are not marked as low-sodium or reduced-sodium. Pickles. Olives.  Grains  Baked goods made with fat, such as croissants, muffins, or some breads. Dry pasta or rice meal packs.  Meats and other proteins  Fatty cuts of meat. Ribs. Fried meat. Aguilar. Bologna, salami, and other precooked or cured meats, such as sausages or meat loaves, that are not lean and low in sodium. Fat from the back of a pig (fatback). Bratwurst. Salted nuts and seeds. Canned beans with added salt. Canned or smoked fish. Whole eggs or egg yolks. Chicken or turkey with skin.  Dairy  Whole or 2% milk, cream, and half-and-half. Whole or full-fat cream cheese. Whole-fat or sweetened yogurt. Full-fat cheese. Nondairy creamers. Whipped toppings. Processed cheese and cheese spreads.  Fats and oils  Butter.  Stick margarine. Lard. Shortening. Ghee. Aguilar fat. Tropical oils, such as coconut, palm kernel, or palm oil.  Seasonings and condiments  Onion salt, garlic salt, seasoned salt, table salt, and sea salt. Worcestershire sauce. Tartar sauce. Barbecue sauce. Teriyaki sauce. Soy sauce, including reduced-sodium soy sauce. Steak sauce. Canned and packaged gravies. Fish sauce. Oyster sauce. Cocktail sauce. Store-bought horseradish. Ketchup. Mustard. Meat flavorings and tenderizers. Bouillon cubes. Hot sauces. Pre-made or packaged marinades. Pre-made or packaged taco seasonings. Relishes. Regular salad dressings.  Other foods  Salted popcorn and pretzels.  The items listed above may not be all the foods and drinks you should avoid. Talk to a dietitian to learn more.  Where to find more information  National Heart, Lung, and Blood [place] (NHLBI): nhlbi.nih.gov  American Heart Association (AHA): heart.org  Academy of Nutrition and Dietetics: eatright.org  National Kidney Foundation (NKF): kidney.org  This information is not intended to replace advice given to you by your health care provider. Make sure you discuss any questions you have with your health care provider.  Document Revised: 01/04/2024 Document Reviewed: 01/04/2024  [employer] Patient Education © 2024 [employer]

## 2025-03-04 NOTE — PROGRESS NOTES
"SUBJECTIVE:  Patient ID: Rosa Kunz is a 84 y.o. female is here today for follow-up.    Chief Complaint: Subdural hematoma  Chief Complaint   Patient presents with    SYMPTOM CHECK     Patient is here for follow up.  She underwent RT rodri hole for subdural hematoma on 12/3/24.  She is using a walker today.  Today she states she has fallen again and broke multiple ribs but states she has not had any \"head\" symptoms.       HPI  84-year-old female who went to the operating room on December 3, 2024 for a right bur hole for subdural hematoma.  She has no complaints.  She denies any headaches.  She is back to her functional baseline.  She is currently using a walker but only uses it when she is outside of the house.    The following portions of the patient's history were reviewed and updated as appropriate: allergies, current medications, past family history, past medical history, past social history, past surgical history and problem list.    OBJECTIVE:    Review of Systems   All other systems reviewed and are negative.         Physical Exam  Constitutional:       General: She is awake.   Eyes:      Extraocular Movements: Extraocular movements intact.      Pupils: Pupils are equal, round, and reactive to light.   Neurological:      Motor: Motor strength is normal.     Deep Tendon Reflexes: Reflexes are normal and symmetric.   Psychiatric:         Speech: Speech normal.         Neurological Exam  Mental Status  Awake. Oriented to person, place and time. Speech is normal. Language is fluent with no aphasia. Attention and concentration are normal.    Cranial Nerves  CN I: Sense of smell is normal.  CN II: Right normal visual field. Left normal visual field.  CN III, IV, VI: Extraocular movements intact bilaterally. Pupils equal round and reactive to light bilaterally.  CN V: Facial sensation is normal.  CN VII:  Right: There is no facial weakness.  Left: There is no facial weakness.  CN XI: Shoulder shrug strength is " normal.  CN XII: Tongue midline without atrophy or fasciculations.    Motor  Normal muscle bulk throughout. Normal muscle tone. Strength is 5/5 throughout all four extremities.    Sensory  Sensation is intact to light touch, pinprick, vibration and proprioception in all four extremities.    Reflexes  Deep tendon reflexes are 2+ and symmetric in all four extremities.    Gait  Normal casual, toe, heel and tandem gait.      Independent Review of Radiographic Studies:   Last CT scan shows resolution of the right subdural hematoma.    ASSESSMENT/PLAN:  The patient is doing very well.  She is back to her baseline.  I be happy to see her again in the future on an as-needed basis.      1. SDH (subdural hematoma)    2. Former smoker    3. Class 1 obesity due to excess calories with serious comorbidity and body mass index (BMI) of 31.0 to 31.9 in adult        The patient's Body mass index is 31.93 kg/m².. BMI is above normal parameters. Recommendations include: educational material    Return if symptoms worsen or fail to improve.      Timo Bailey MD

## 2025-06-25 ENCOUNTER — APPOINTMENT (OUTPATIENT)
Dept: CT IMAGING | Facility: HOSPITAL | Age: 85
End: 2025-06-25
Payer: MEDICARE

## 2025-06-25 ENCOUNTER — HOSPITAL ENCOUNTER (EMERGENCY)
Facility: HOSPITAL | Age: 85
Discharge: HOME | End: 2025-06-25
Admitting: EMERGENCY MEDICINE
Payer: MEDICARE

## 2025-06-25 ENCOUNTER — APPOINTMENT (OUTPATIENT)
Dept: GENERAL RADIOLOGY | Facility: HOSPITAL | Age: 85
End: 2025-06-25
Payer: MEDICARE

## 2025-06-25 VITALS
SYSTOLIC BLOOD PRESSURE: 140 MMHG | HEART RATE: 60 BPM | BODY MASS INDEX: 33.04 KG/M2 | TEMPERATURE: 97.6 F | RESPIRATION RATE: 17 BRPM | WEIGHT: 175 LBS | OXYGEN SATURATION: 98 % | HEIGHT: 61 IN | DIASTOLIC BLOOD PRESSURE: 78 MMHG

## 2025-06-25 DIAGNOSIS — S22.42XA CLOSED FRACTURE OF MULTIPLE RIBS OF LEFT SIDE, INITIAL ENCOUNTER: Primary | ICD-10-CM

## 2025-06-25 PROCEDURE — 71101 X-RAY EXAM UNILAT RIBS/CHEST: CPT

## 2025-06-25 PROCEDURE — 99284 EMERGENCY DEPT VISIT MOD MDM: CPT

## 2025-06-25 PROCEDURE — 71250 CT THORAX DX C-: CPT

## 2025-06-25 RX ORDER — HYDROCODONE BITARTRATE AND ACETAMINOPHEN 5; 325 MG/1; MG/1
1 TABLET ORAL EVERY 8 HOURS PRN
Qty: 10 TABLET | Refills: 0 | Status: SHIPPED | OUTPATIENT
Start: 2025-06-25

## 2025-06-25 RX ORDER — ACETAMINOPHEN 500 MG
1000 TABLET ORAL ONCE
Status: COMPLETED | OUTPATIENT
Start: 2025-06-25 | End: 2025-06-25

## 2025-06-25 RX ORDER — ONDANSETRON 4 MG/1
4 TABLET, ORALLY DISINTEGRATING ORAL EVERY 6 HOURS PRN
Qty: 15 TABLET | Refills: 0 | Status: SHIPPED | OUTPATIENT
Start: 2025-06-25

## 2025-06-25 RX ADMIN — ACETAMINOPHEN 1000 MG: 500 TABLET, FILM COATED ORAL at 10:56

## 2025-06-25 NOTE — ED PROVIDER NOTES
Subjective   History of Present Illness  Patient is an 85-year-old female who presents to the ER with complaints of left rib pain.  She states several months ago she fell and sustained multiple rib fractures.  Yesterday morning patient laying down to pick something up and states that she bent over quickly.  Upon doing so she felt a pop in her left rib region.  Since this time she has had pain with deep breathing and is concerned she may have a new rib fracture.  Due to symptoms described come the ER for evaluation and treatment.  Past medical history significant for diabetes, GERD, hypertension, multiple rib fractures        Review of Systems   Constitutional: Negative.    HENT: Negative.     Eyes: Negative.    Respiratory:          Positive for rib pain   Cardiovascular: Negative.    Gastrointestinal: Negative.    Endocrine: Negative.    Genitourinary: Negative.    Musculoskeletal: Negative.    Skin: Negative.    Allergic/Immunologic: Negative.    Neurological: Negative.    Hematological: Negative.    Psychiatric/Behavioral: Negative.     All other systems reviewed and are negative.      Past Medical History:   Diagnosis Date    Diabetes mellitus     GERD (gastroesophageal reflux disease)     Hypertension        Allergies   Allergen Reactions    Sulfa Antibiotics Rash       Past Surgical History:   Procedure Laterality Date    CARON HOLE Right 12/3/2024    Procedure: CARON HOLE;  Surgeon: Timo Bailey MD;  Location: [facility];  Service: Neurosurgery;  Laterality: Right;    CHOLECYSTECTOMY      HEMORRHOIDECTOMY      CORTEZ'S NEUROMA EXCISION Left        Family History   Problem Relation Age of Onset    Heart disease Mother     Diabetes Mother     Hypertension Mother     COPD Father     Heart disease Father     Hypertension Father        Social History     Socioeconomic History    Marital status:    Tobacco Use    Smoking status: Former     Types: Cigarettes     Passive exposure: Past    Smokeless tobacco:  Never    Tobacco comments:     Social smoker when young    Vaping Use    Vaping status: Never Used   Substance and Sexual Activity    Alcohol use: Never    Drug use: Never    Sexual activity: Defer           Objective   Physical Exam  Vitals and nursing note reviewed.   Constitutional:       Appearance: She is well-developed.   HENT:      Head: Normocephalic and atraumatic.      Nose: Nose normal.   Eyes:      Conjunctiva/sclera: Conjunctivae normal.      Pupils: Pupils are equal, round, and reactive to light.   Cardiovascular:      Rate and Rhythm: Normal rate and regular rhythm.      Heart sounds: Normal heart sounds.   Pulmonary:      Effort: Pulmonary effort is normal.      Breath sounds: Normal breath sounds.   Chest:      Chest wall: Tenderness present.   Abdominal:      General: Bowel sounds are normal.      Palpations: Abdomen is soft.   Musculoskeletal:         General: Normal range of motion.      Cervical back: Normal range of motion and neck supple.   Skin:     General: Skin is warm and dry.   Neurological:      Mental Status: She is alert and oriented to person, place, and time.   Psychiatric:         Behavior: Behavior normal.         Procedures           ED Course                                                       Medical Decision Making  Patient is an 85-year-old female who presents to the ER with complaints of left rib pain.  She states several months ago she fell and sustained multiple rib fractures.  Yesterday morning patient laying down to pick something up and states that she bent over quickly.  Upon doing so she felt a pop in her left rib region.  Since this time she has had pain with deep breathing and is concerned she may have a new rib fracture.  Due to symptoms described come the ER for evaluation and treatment.  Past medical history significant for diabetes, GERD, hypertension, multiple rib fractures  Differential diagnosis includes but not limited to rib contusion, rib fracture,  strain, and other etiologies    Amount and/or Complexity of Data Reviewed  Radiology: ordered.    Risk  OTC drugs.      CT Chest Without Contrast Diagnostic   Final Result   1. Acute appearing fractures of the left anterolateral fifth, sixth and   eighth ribs. Remote fracture of the left lateral ninth rib. No   right-sided rib fractures present.   2. Large hiatal hernia.   3. No pneumothorax or pulmonary contusion. Right upper lobe atelectasis   or scarring is present as well as left lingular atelectasis. No   consolidative pneumonia or effusion. No suspicious nodularity.               This report was signed and finalized on 6/25/2025 12:03 PM by Dr. Harvey Carney MD.          XR Ribs Left With PA Chest   Final Result   1.. Suspected remote fractures of the left second through fifth ribs.   There are also fractures involving the anterolateral fifth and eighth   rib suspected to be acute in nature without definite callus formation.   2. Lungs are fully expanded and clear except for atelectasis or scarring   in the left lingula. A large hiatal hernia is present.       This report was signed and finalized on 6/25/2025 11:10 AM by Dr. Harvey Carney MD.             Offered admission however patient declines.  She fell several months ago and sustained numerous rib fractures.  She still has her incentive spirometer device at home which she states she will use again.  We strongly encouraged to avoid any taping of the chest, hold pillow for splinting, and continue to cough and deep breathe to avoid developing a pneumonia.  We recommend doing incentive spirometer twice daily.  We will prescribe pain medication to take as needed for rib pain.  She will need close follow-up with PCP and if any worsening symptoms will need to return.  Discussed with Dr. Bryan who agrees with treatment plan.   Final diagnoses:   Closed fracture of multiple ribs of left side, initial encounter       ED Disposition  ED Disposition        ED Disposition   Discharge    Condition   Good    Comment   --               No follow-up provider specified.       Medication List        New Prescriptions      HYDROcodone-acetaminophen 5-325 MG per tablet  Commonly known as: NORCO  Take 1 tablet by mouth Every 8 (Eight) Hours As Needed for Moderate Pain.     ondansetron ODT 4 MG disintegrating tablet  Commonly known as: ZOFRAN-ODT  Place 1 tablet on the tongue Every 6 (Six) Hours As Needed for Nausea.               Where to Get Your Medications        These medications were sent to [facility] [place], [place] - [address] [phone]  - [phone] [address] [address]      Phone: [phone]   HYDROcodone-acetaminophen 5-325 MG per tablet  ondansetron ODT 4 MG disintegrating tablet            Larisa Mcmahon, APRN  06/25/25 [id number]

## 2025-06-25 NOTE — DISCHARGE INSTRUCTIONS
Remember to do incentive spirometer twice daily  Use pillow for splinting to cough and deep breathe  F/u with pcp for re-evaluation

## (undated) DEVICE — SCRWDRVR SMARTO BATRY/PWR TORQ/45NCM 210RPM DISP STRL

## (undated) DEVICE — GLV SURG DERMASSURE GRN LF PF 8.0

## (undated) DEVICE — 6.0MM ACORN

## (undated) DEVICE — PK CRANI 30

## (undated) DEVICE — ADHS SKIN PREMIERPRO EXOFIN TOPICAL HI/VISC .5ML

## (undated) DEVICE — UTILITY MARKER W/MED LABELS: Brand: MEDLINE

## (undated) DEVICE — CATH IV ANGIO FEP 12G 3IN LTBLU 10PK

## (undated) DEVICE — PAD,NON-ADHERENT,3X8,STERILE,LF,1/PK: Brand: MEDLINE

## (undated) DEVICE — 3M™ STERI-DRAPE™ CRANIOTOMY DRAPE WITH IOBAN™ 2 INCISE POUCH 6687: Brand: STERI-DRAPE™ IOBAN™ 2

## (undated) DEVICE — APPL DURAPREP IODOPHOR APL 26ML

## (undated) DEVICE — PROXIMATE RH ROTATING HEAD SKIN STAPLERS (35 REGULAR) CONTAINS 35 STAINLESS STEEL STAPLES: Brand: PROXIMATE

## (undated) DEVICE — SYRINGE,PISTON,IRRIGATION,60ML,STERILE: Brand: MEDLINE

## (undated) DEVICE — GLV SURG BIOGEL LTX PF 8

## (undated) DEVICE — 1LYRTR 16FR10ML100%SIL UMS SNP: Brand: MEDLINE INDUSTRIES, INC.

## (undated) DEVICE — SYR LUERLOK 50ML

## (undated) DEVICE — SUT NUROLON 4/0 TF18 CR8 I8IN C584D

## (undated) DEVICE — SUT MNCRYL 4/0 PS2 27IN UD MCP426H

## (undated) DEVICE — TRAP FLD MINIVAC MEGADYNE 100ML

## (undated) DEVICE — SYR SLP TP 10ML DISP

## (undated) DEVICE — SUT VIC 3/0 CT1 CR8 18IN VCP738D

## (undated) DEVICE — STPCK 4WY ON/OFF VLV M/COLAR FIT 45PSI STRL

## (undated) DEVICE — CONN FLX BREATHE CIRCT

## (undated) DEVICE — THE STERILE LIGHT HANDLE COVER IS USED WITH STERIS SURGICAL LIGHTING AND VISUALIZATION SYSTEMS.

## (undated) DEVICE — INTEGRA® EXTERNAL CSF DRAINAGE SYSTEM REPLACEMENT BAGS: Brand: INTEGRA®

## (undated) DEVICE — COVER,MAYO STAND,STERILE: Brand: MEDLINE

## (undated) DEVICE — STRIP,CLOSURE,WOUND,MEDI-STRIP,1/2X4: Brand: MEDLINE

## (undated) DEVICE — THE STERILE THE STERIS STERILE CAMERA HANDLE COVERS ARE DESIGNED FOR HARMONYAIR 4K CAMERA MODULE, AND PROVIDE STERILE CONTROL THAT ALLOW FOR INCREASING AND DECREASING ILLUMINATION THROUGH SEVEN INTENSITY LEVELS.